# Patient Record
Sex: FEMALE | Race: WHITE | NOT HISPANIC OR LATINO | Employment: UNEMPLOYED | URBAN - METROPOLITAN AREA
[De-identification: names, ages, dates, MRNs, and addresses within clinical notes are randomized per-mention and may not be internally consistent; named-entity substitution may affect disease eponyms.]

---

## 2021-03-04 ENCOUNTER — HOSPITAL ENCOUNTER (OUTPATIENT)
Dept: DIABETES SERVICES | Facility: HOSPITAL | Age: 31
Discharge: HOME | End: 2021-03-04
Attending: OBSTETRICS & GYNECOLOGY

## 2021-04-29 ENCOUNTER — HOSPITAL ENCOUNTER (OUTPATIENT)
Dept: PREADMISSION TESTING | Facility: HOSPITAL | Age: 31
Discharge: HOME | End: 2021-04-29
Attending: OBSTETRICS & GYNECOLOGY

## 2021-04-30 LAB — SARS-COV-2 RNA SPEC QL NAA+PROBE: NOT DETECTED

## 2021-10-11 ENCOUNTER — OFFICE VISIT (OUTPATIENT)
Dept: INTERNAL MEDICINE | Facility: CLINIC | Age: 31
End: 2021-10-11

## 2021-10-11 VITALS
OXYGEN SATURATION: 98 % | BODY MASS INDEX: 21.97 KG/M2 | HEIGHT: 63 IN | DIASTOLIC BLOOD PRESSURE: 80 MMHG | HEART RATE: 92 BPM | TEMPERATURE: 99 F | WEIGHT: 124 LBS | RESPIRATION RATE: 16 BRPM | SYSTOLIC BLOOD PRESSURE: 122 MMHG

## 2021-10-11 DIAGNOSIS — E55.9 VITAMIN D DEFICIENCY: Primary | ICD-10-CM

## 2021-10-11 DIAGNOSIS — F33.1 MAJOR DEPRESSIVE DISORDER, RECURRENT EPISODE, MODERATE DEGREE (HCC): ICD-10-CM

## 2021-10-11 DIAGNOSIS — O24.419 GESTATIONAL DIABETES MELLITUS (GDM), ANTEPARTUM, GESTATIONAL DIABETES METHOD OF CONTROL UNSPECIFIED: ICD-10-CM

## 2021-10-11 DIAGNOSIS — E78.2 MIXED HYPERLIPIDEMIA: ICD-10-CM

## 2021-10-11 PROCEDURE — 99203 OFFICE O/P NEW LOW 30 MIN: CPT | Performed by: PHYSICIAN ASSISTANT

## 2021-10-11 RX ORDER — NORETHINDRONE AND ETHINYL ESTRADIOL 1 MG-35MCG
1 KIT ORAL DAILY
COMMUNITY
Start: 2021-09-21 | End: 2022-07-25 | Stop reason: SDUPTHER

## 2021-10-11 NOTE — ASSESSMENT & PLAN NOTE
Patient's depression is recurrent and is mild without psychosis. Their depression is currently in full remission and the condition is improving with lifestyle modifications. This will be reassessed at the next regular appointment. F/U as described:doing well without medicine.

## 2021-10-11 NOTE — PROGRESS NOTES
"Chief Complaint  Establish Care (no previous pcp ), gestational diabetes needs labs    Subjective          Kenzie Marcos presents to [facility]  Last PCP: Zo years ago   Previous Specialists: [facility] in [place], OBGYN for HPV and biopsy  Last labs: 2020 during pregnacy  Last mammogram: never, no family history of breast cancer  Last pap: 2020- Fela Drake  Last colonoscopy:  Never, no family history of colon cancer     Gestation diabetes- diet controlled. Had baby in 5/2021. She has not had labs since delivery  Vit d Def- hx of low vit d.     Depression- Pt feels down at times.  She states she is ''high strung''  She has been on medicine in the past but has learned to cope with mood.   Denies si/hi. Previously seeing therapy.  Feels mood is doing well currently.    HLD- pt was told chol elevated in the past but she was trying to conceive so she did not get started on medicine.  Dad has high cholesterol      Past Medical History:   Diagnosis Date   • Anxiety    • Depression    • Gestational diabetes    • Vitamin D deficiency         History reviewed. No pertinent surgical history.     Current Outpatient Medications on File Prior to Visit   Medication Sig Dispense Refill   • Alyacen 1/35 1-35 MG-MCG per tablet Take 1 tablet by mouth Daily.       No current facility-administered medications on file prior to visit.        No Known Allergies    Social History     Tobacco Use   Smoking Status Never Smoker   Smokeless Tobacco Never Used          Objective   Vital Signs:   /80   Pulse 92   Temp 99 °F (37.2 °C)   Resp 16   Ht 160 cm (63\")   Wt 56.2 kg (124 lb)   SpO2 98%   BMI 21.97 kg/m²     Physical Exam  Vitals reviewed.   Constitutional:       Appearance: Normal appearance.   HENT:      Head: Normocephalic and atraumatic.      Nose: Nose normal.      Mouth/Throat:      Mouth: Mucous membranes are moist.   Eyes:      Extraocular Movements: " Extraocular movements intact.      Conjunctiva/sclera: Conjunctivae normal.      Pupils: Pupils are equal, round, and reactive to light.   Cardiovascular:      Rate and Rhythm: Normal rate and regular rhythm.   Pulmonary:      Effort: Pulmonary effort is normal.      Breath sounds: Normal breath sounds.   Abdominal:      General: Abdomen is flat. Bowel sounds are normal.      Palpations: Abdomen is soft.   Musculoskeletal:         General: Normal range of motion.   Neurological:      General: No focal deficit present.      Mental Status: She is alert and oriented to person, place, and time.   Psychiatric:         Mood and Affect: Mood normal.        Result Review :                 Assessment and Plan    Diagnoses and all orders for this visit:    1. Vitamin D deficiency (Primary)  Assessment & Plan:  Labs today, will discuss tx if needed based on results    Orders:  -     Comprehensive Metabolic Panel; Future  -     CBC & Differential; Future  -     Vitamin D 25 hydroxy; Future    2. Gestational diabetes mellitus (GDM), antepartum, gestational diabetes method of control unspecified  Assessment & Plan:  Labs today    Orders:  -     Comprehensive Metabolic Panel; Future  -     CBC & Differential; Future  -     TSH; Future  -     Lipid Panel; Future  -     Hemoglobin A1c; Future    3. Mixed hyperlipidemia  Assessment & Plan:  Labs today. Will discuss medicine if needed based on results.    Orders:  -     Comprehensive Metabolic Panel; Future  -     CBC & Differential; Future  -     TSH; Future  -     Lipid Panel; Future    4. Major depressive disorder, recurrent episode, moderate degree (HCC)  Assessment & Plan:  Patient's depression is recurrent and is mild without psychosis. Their depression is currently in full remission and the condition is improving with lifestyle modifications. This will be reassessed at the next regular appointment. F/U as described:doing well without medicine.        Follow Up   Return in about  6 months (around 4/11/2022).  Patient was given instructions and counseling regarding her condition or for health maintenance advice. Please see specific information pulled into the AVS if appropriate.

## 2021-10-11 NOTE — PATIENT INSTRUCTIONS
"High Cholesterol    High cholesterol is a condition in which the blood has high levels of a white, waxy substance similar to fat (cholesterol). The liver makes all the cholesterol that the body needs. The human body needs small amounts of cholesterol to help build cells. A person gets extra or excess cholesterol from the food that he or she eats.  The blood carries cholesterol from the liver to the rest of the body. If you have high cholesterol, deposits (plaques) may build up on the walls of your arteries. Arteries are the blood vessels that carry blood away from your heart. These plaques make the arteries narrow and stiff.  Cholesterol plaques increase your risk for heart attack and stroke. Work with your health care provider to keep your cholesterol levels in a healthy range.  What increases the risk?  The following factors may make you more likely to develop this condition:  · Eating foods that are high in animal fat (saturated fat) or cholesterol.  · Being overweight.  · Not getting enough exercise.  · A family history of high cholesterol (familial hypercholesterolemia).  · Use of tobacco products.  · Having diabetes.  What are the signs or symptoms?  There are no symptoms of this condition.  How is this diagnosed?  This condition may be diagnosed based on the results of a blood test.  · If you are older than 20 years of age, your health care provider may check your cholesterol levels every 4-6 years.  · You may be checked more often if you have high cholesterol or other risk factors for heart disease.  The blood test for cholesterol measures:  · \"Bad\" cholesterol, or LDL cholesterol. This is the main type of cholesterol that causes heart disease. The desired level is less than 100 mg/dL.  · \"Good\" cholesterol, or HDL cholesterol. HDL helps protect against heart disease by cleaning the arteries and carrying the LDL to the liver for processing. The desired level for HDL is 60 mg/dL or higher.  · Triglycerides. " These are fats that your body can store or burn for energy. The desired level is less than 150 mg/dL.  · Total cholesterol. This measures the total amount of cholesterol in your blood and includes LDL, HDL, and triglycerides. The desired level is less than 200 mg/dL.  How is this treated?  This condition may be treated with:  · Diet changes. You may be asked to eat foods that have more fiber and less saturated fats or added sugar.  · Lifestyle changes. These may include regular exercise, maintaining a healthy weight, and quitting use of tobacco products.  · Medicines. These are given when diet and lifestyle changes have not worked. You may be prescribed a statin medicine to help lower your cholesterol levels.  Follow these instructions at home:  Eating and drinking    · Eat a healthy, balanced diet. This diet includes:  ? Daily servings of a variety of fresh, frozen, or canned fruits and vegetables.  ? Daily servings of whole grain foods that are rich in fiber.  ? Foods that are low in saturated fats and trans fats. These include poultry and fish without skin, lean cuts of meat, and low-fat dairy products.  ? A variety of fish, especially oily fish that contain omega-3 fatty acids. Aim to eat fish at least 2 times a week.  · Avoid foods and drinks that have added sugar.  · Use healthy cooking methods, such as roasting, grilling, broiling, baking, poaching, steaming, and stir-frying. Do not castro your food except for stir-frying.    Lifestyle    · Get regular exercise. Aim to exercise for a total of 150 minutes a week. Increase your activity level by doing activities such as gardening, walking, and taking the stairs.  · Do not use any products that contain nicotine or tobacco, such as cigarettes, e-cigarettes, and chewing tobacco. If you need help quitting, ask your health care provider.    General instructions  · Take over-the-counter and prescription medicines only as told by your health care provider.  · Keep all  "follow-up visits as told by your health care provider. This is important.  Where to find more information  · American Heart Association: www.heart.org  · National Heart, Lung, and Blood [employer]: www.nhlbi.nih.gov  Contact a health care provider if:  · You have trouble achieving or maintaining a healthy diet or weight.  · You are starting an exercise program.  · You are unable to stop smoking.  Get help right away if:  · You have chest pain.  · You have trouble breathing.  · You have any symptoms of a stroke. \"BE FAST\" is an easy way to remember the main warning signs of a stroke:  ? B - Balance. Signs are dizziness, sudden trouble walking, or loss of balance.  ? E - Eyes. Signs are trouble seeing or a sudden change in vision.  ? F - Face. Signs are sudden weakness or numbness of the face, or the face or eyelid drooping on one side.  ? A - Arms. Signs are weakness or numbness in an arm. This happens suddenly and usually on one side of the body.  ? S - Speech. Signs are sudden trouble speaking, slurred speech, or trouble understanding what people say.  ? T - Time. Time to call emergency services. Write down what time symptoms started.  · You have other signs of a stroke, such as:  ? A sudden, severe headache with no known cause.  ? Nausea or vomiting.  ? Seizure.  These symptoms may represent a serious problem that is an emergency. Do not wait to see if the symptoms will go away. Get medical help right away. Call your local emergency services (911 in the [place] Do not drive yourself to the hospital.  Summary  · Cholesterol plaques increase your risk for heart attack and stroke. Work with your health care provider to keep your cholesterol levels in a healthy range.  · Eat a healthy, balanced diet, get regular exercise, and maintain a healthy weight.  · Do not use any products that contain nicotine or tobacco, such as cigarettes, e-cigarettes, and chewing tobacco.  · Get help right away if you have any symptoms of a " stroke.  This information is not intended to replace advice given to you by your health care provider. Make sure you discuss any questions you have with your health care provider.  Document Revised: 11/16/2020 Document Reviewed: 11/16/2020  Elsevier Patient Education © 2021 [employer]

## 2021-10-14 ENCOUNTER — CLINICAL SUPPORT (OUTPATIENT)
Dept: INTERNAL MEDICINE | Facility: CLINIC | Age: 31
End: 2021-10-14

## 2021-10-14 DIAGNOSIS — E55.9 VITAMIN D DEFICIENCY: ICD-10-CM

## 2021-10-14 DIAGNOSIS — E78.2 MIXED HYPERLIPIDEMIA: ICD-10-CM

## 2021-10-14 DIAGNOSIS — O24.419 GESTATIONAL DIABETES MELLITUS (GDM), ANTEPARTUM, GESTATIONAL DIABETES METHOD OF CONTROL UNSPECIFIED: ICD-10-CM

## 2021-10-14 LAB
25(OH)D3 SERPL-MCNC: 39 NG/ML
ALBUMIN SERPL-MCNC: 4.4 G/DL (ref 3.5–5.2)
ALBUMIN/GLOB SERPL: 1.6 G/DL
ALP SERPL-CCNC: 55 U/L (ref 39–117)
ALT SERPL W P-5'-P-CCNC: 17 U/L (ref 1–33)
ANION GAP SERPL CALCULATED.3IONS-SCNC: 9.6 MMOL/L (ref 5–15)
AST SERPL-CCNC: 19 U/L (ref 1–32)
BASOPHILS # BLD AUTO: 0.05 10*3/MM3 (ref 0–0.2)
BASOPHILS NFR BLD AUTO: 0.9 % (ref 0–1.5)
BILIRUB SERPL-MCNC: 0.4 MG/DL (ref 0–1.2)
BUN SERPL-MCNC: 14 MG/DL (ref 6–20)
BUN/CREAT SERPL: 18.2 (ref 7–25)
CALCIUM SPEC-SCNC: 9.2 MG/DL (ref 8.6–10.5)
CHLORIDE SERPL-SCNC: 106 MMOL/L (ref 98–107)
CHOLEST SERPL-MCNC: 233 MG/DL (ref 0–200)
CO2 SERPL-SCNC: 25.4 MMOL/L (ref 22–29)
CREAT SERPL-MCNC: 0.77 MG/DL (ref 0.57–1)
DEPRECATED RDW RBC AUTO: 39.6 FL (ref 37–54)
EOSINOPHIL # BLD AUTO: 0.13 10*3/MM3 (ref 0–0.4)
EOSINOPHIL NFR BLD AUTO: 2.3 % (ref 0.3–6.2)
ERYTHROCYTE [DISTWIDTH] IN BLOOD BY AUTOMATED COUNT: 12.1 % (ref 12.3–15.4)
GFR SERPL CREATININE-BSD FRML MDRD: 87 ML/MIN/1.73
GLOBULIN UR ELPH-MCNC: 2.7 GM/DL
GLUCOSE SERPL-MCNC: 97 MG/DL (ref 65–99)
HBA1C MFR BLD: 4.7 % (ref 4.8–5.6)
HCT VFR BLD AUTO: 42.6 % (ref 34–46.6)
HDLC SERPL-MCNC: 54 MG/DL (ref 40–60)
HGB BLD-MCNC: 14.9 G/DL (ref 12–15.9)
IMM GRANULOCYTES # BLD AUTO: 0.02 10*3/MM3 (ref 0–0.05)
IMM GRANULOCYTES NFR BLD AUTO: 0.3 % (ref 0–0.5)
LDLC SERPL CALC-MCNC: 154 MG/DL (ref 0–100)
LDLC/HDLC SERPL: 2.81 {RATIO}
LYMPHOCYTES # BLD AUTO: 1.77 10*3/MM3 (ref 0.7–3.1)
LYMPHOCYTES NFR BLD AUTO: 30.8 % (ref 19.6–45.3)
MCH RBC QN AUTO: 31.1 PG (ref 26.6–33)
MCHC RBC AUTO-ENTMCNC: 35 G/DL (ref 31.5–35.7)
MCV RBC AUTO: 88.9 FL (ref 79–97)
MONOCYTES # BLD AUTO: 0.4 10*3/MM3 (ref 0.1–0.9)
MONOCYTES NFR BLD AUTO: 7 % (ref 5–12)
NEUTROPHILS NFR BLD AUTO: 3.38 10*3/MM3 (ref 1.7–7)
NEUTROPHILS NFR BLD AUTO: 58.7 % (ref 42.7–76)
NRBC BLD AUTO-RTO: 0.2 /100 WBC (ref 0–0.2)
PLATELET # BLD AUTO: 256 10*3/MM3 (ref 140–450)
PMV BLD AUTO: 11.5 FL (ref 6–12)
POTASSIUM SERPL-SCNC: 4.3 MMOL/L (ref 3.5–5.2)
PROT SERPL-MCNC: 7.1 G/DL (ref 6–8.5)
RBC # BLD AUTO: 4.79 10*6/MM3 (ref 3.77–5.28)
SODIUM SERPL-SCNC: 141 MMOL/L (ref 136–145)
TRIGL SERPL-MCNC: 137 MG/DL (ref 0–150)
TSH SERPL DL<=0.05 MIU/L-ACNC: 1.17 UIU/ML (ref 0.27–4.2)
VLDLC SERPL-MCNC: 25 MG/DL (ref 5–40)
WBC # BLD AUTO: 5.75 10*3/MM3 (ref 3.4–10.8)

## 2021-10-14 PROCEDURE — 84443 ASSAY THYROID STIM HORMONE: CPT | Performed by: PHYSICIAN ASSISTANT

## 2021-10-14 PROCEDURE — 80053 COMPREHEN METABOLIC PANEL: CPT | Performed by: PHYSICIAN ASSISTANT

## 2021-10-14 PROCEDURE — 85025 COMPLETE CBC W/AUTO DIFF WBC: CPT | Performed by: PHYSICIAN ASSISTANT

## 2021-10-14 PROCEDURE — 36415 COLL VENOUS BLD VENIPUNCTURE: CPT | Performed by: PHYSICIAN ASSISTANT

## 2021-10-14 PROCEDURE — 80061 LIPID PANEL: CPT | Performed by: PHYSICIAN ASSISTANT

## 2021-10-14 PROCEDURE — 82306 VITAMIN D 25 HYDROXY: CPT | Performed by: PHYSICIAN ASSISTANT

## 2021-10-14 PROCEDURE — 83036 HEMOGLOBIN GLYCOSYLATED A1C: CPT | Performed by: PHYSICIAN ASSISTANT

## 2022-04-11 ENCOUNTER — OFFICE VISIT (OUTPATIENT)
Dept: INTERNAL MEDICINE | Facility: CLINIC | Age: 32
End: 2022-04-11

## 2022-04-11 VITALS
WEIGHT: 126 LBS | OXYGEN SATURATION: 98 % | TEMPERATURE: 98.6 F | HEART RATE: 87 BPM | DIASTOLIC BLOOD PRESSURE: 68 MMHG | SYSTOLIC BLOOD PRESSURE: 122 MMHG | HEIGHT: 63 IN | BODY MASS INDEX: 22.32 KG/M2 | RESPIRATION RATE: 15 BRPM

## 2022-04-11 DIAGNOSIS — R53.83 OTHER FATIGUE: ICD-10-CM

## 2022-04-11 DIAGNOSIS — E78.2 MIXED HYPERLIPIDEMIA: ICD-10-CM

## 2022-04-11 DIAGNOSIS — F33.1 MAJOR DEPRESSIVE DISORDER, RECURRENT EPISODE, MODERATE DEGREE: Primary | ICD-10-CM

## 2022-04-11 DIAGNOSIS — E55.9 VITAMIN D DEFICIENCY: ICD-10-CM

## 2022-04-11 LAB
25(OH)D3 SERPL-MCNC: 51.5 NG/ML (ref 30–100)
ALBUMIN SERPL-MCNC: 4.5 G/DL (ref 3.5–5.2)
ALBUMIN/GLOB SERPL: 1.7 G/DL
ALP SERPL-CCNC: 54 U/L (ref 39–117)
ALT SERPL W P-5'-P-CCNC: 16 U/L (ref 1–33)
ANION GAP SERPL CALCULATED.3IONS-SCNC: 12.2 MMOL/L (ref 5–15)
AST SERPL-CCNC: 17 U/L (ref 1–32)
BASOPHILS # BLD AUTO: 0.03 10*3/MM3 (ref 0–0.2)
BASOPHILS NFR BLD AUTO: 0.5 % (ref 0–1.5)
BILIRUB SERPL-MCNC: 0.3 MG/DL (ref 0–1.2)
BUN SERPL-MCNC: 10 MG/DL (ref 6–20)
BUN/CREAT SERPL: 14.3 (ref 7–25)
CALCIUM SPEC-SCNC: 9.3 MG/DL (ref 8.6–10.5)
CHLORIDE SERPL-SCNC: 104 MMOL/L (ref 98–107)
CHOLEST SERPL-MCNC: 208 MG/DL (ref 0–200)
CO2 SERPL-SCNC: 22.8 MMOL/L (ref 22–29)
CREAT SERPL-MCNC: 0.7 MG/DL (ref 0.57–1)
DEPRECATED RDW RBC AUTO: 36.7 FL (ref 37–54)
EGFRCR SERPLBLD CKD-EPI 2021: 118 ML/MIN/1.73
EOSINOPHIL # BLD AUTO: 0.06 10*3/MM3 (ref 0–0.4)
EOSINOPHIL NFR BLD AUTO: 1.1 % (ref 0.3–6.2)
ERYTHROCYTE [DISTWIDTH] IN BLOOD BY AUTOMATED COUNT: 11.9 % (ref 12.3–15.4)
GLOBULIN UR ELPH-MCNC: 2.7 GM/DL
GLUCOSE SERPL-MCNC: 83 MG/DL (ref 65–99)
HCT VFR BLD AUTO: 40.5 % (ref 34–46.6)
HDLC SERPL-MCNC: 46 MG/DL (ref 40–60)
HGB BLD-MCNC: 14.5 G/DL (ref 12–15.9)
IMM GRANULOCYTES # BLD AUTO: 0.02 10*3/MM3 (ref 0–0.05)
IMM GRANULOCYTES NFR BLD AUTO: 0.4 % (ref 0–0.5)
LDLC SERPL CALC-MCNC: 133 MG/DL (ref 0–100)
LDLC/HDLC SERPL: 2.82 {RATIO}
LYMPHOCYTES # BLD AUTO: 1.57 10*3/MM3 (ref 0.7–3.1)
LYMPHOCYTES NFR BLD AUTO: 27.5 % (ref 19.6–45.3)
MCH RBC QN AUTO: 30.7 PG (ref 26.6–33)
MCHC RBC AUTO-ENTMCNC: 35.8 G/DL (ref 31.5–35.7)
MCV RBC AUTO: 85.6 FL (ref 79–97)
MONOCYTES # BLD AUTO: 0.33 10*3/MM3 (ref 0.1–0.9)
MONOCYTES NFR BLD AUTO: 5.8 % (ref 5–12)
NEUTROPHILS NFR BLD AUTO: 3.7 10*3/MM3 (ref 1.7–7)
NEUTROPHILS NFR BLD AUTO: 64.7 % (ref 42.7–76)
NRBC BLD AUTO-RTO: 0 /100 WBC (ref 0–0.2)
PLATELET # BLD AUTO: 253 10*3/MM3 (ref 140–450)
PMV BLD AUTO: 11.6 FL (ref 6–12)
POTASSIUM SERPL-SCNC: 4.5 MMOL/L (ref 3.5–5.2)
PROT SERPL-MCNC: 7.2 G/DL (ref 6–8.5)
RBC # BLD AUTO: 4.73 10*6/MM3 (ref 3.77–5.28)
SODIUM SERPL-SCNC: 139 MMOL/L (ref 136–145)
TRIGL SERPL-MCNC: 161 MG/DL (ref 0–150)
TSH SERPL DL<=0.05 MIU/L-ACNC: 1.14 UIU/ML (ref 0.27–4.2)
VLDLC SERPL-MCNC: 29 MG/DL (ref 5–40)
WBC NRBC COR # BLD: 5.71 10*3/MM3 (ref 3.4–10.8)

## 2022-04-11 PROCEDURE — 80061 LIPID PANEL: CPT | Performed by: PHYSICIAN ASSISTANT

## 2022-04-11 PROCEDURE — 99214 OFFICE O/P EST MOD 30 MIN: CPT | Performed by: PHYSICIAN ASSISTANT

## 2022-04-11 PROCEDURE — 80050 GENERAL HEALTH PANEL: CPT | Performed by: PHYSICIAN ASSISTANT

## 2022-04-11 PROCEDURE — 82306 VITAMIN D 25 HYDROXY: CPT | Performed by: PHYSICIAN ASSISTANT

## 2022-04-11 RX ORDER — IBUPROFEN 800 MG/1
800 TABLET ORAL EVERY 6 HOURS PRN
Qty: 90 TABLET | Refills: 0 | Status: SHIPPED | OUTPATIENT
Start: 2022-04-11 | End: 2022-05-09

## 2022-04-11 NOTE — PROGRESS NOTES
"Chief Complaint  Fatigue    Subjective     {Problem List  Visit Diagnosis   Encounters  Notes  Medications  Labs  Result Review Imaging  Media :23}     Kenzie Marcos presents to [facility]  HLD: has changed diet a lot since last labs  She has increased exercise.     Fatigue: pt states she has been very tired the past few wks  Taking naps which is not normal   Pt sleeping well through the night.   She has a 11 month old at home and she is main caregiver.     Vit d def: has not been taking medicine regularly but increased recently.     Depression: states she has been more emotional than normal  Feels down due to weather.   Denies si/hi.   She has had increase stress due to her boyfriend being out of town with work.        Past Medical History:   Diagnosis Date   • Anxiety    • Depression    • Gestational diabetes    • Vitamin D deficiency         History reviewed. No pertinent surgical history.     Current Outpatient Medications on File Prior to Visit   Medication Sig Dispense Refill   • Alyacen 1/35 1-35 MG-MCG per tablet Take 1 tablet by mouth Daily.       No current facility-administered medications on file prior to visit.        No Known Allergies    Social History     Tobacco Use   Smoking Status Never Smoker   Smokeless Tobacco Never Used          Objective   Vital Signs:   /68   Pulse 87   Temp 98.6 °F (37 °C)   Resp 15   Ht 160 cm (63\")   Wt 57.2 kg (126 lb)   SpO2 98%   BMI 22.32 kg/m²     Physical Exam  Vitals reviewed.   Constitutional:       Appearance: Normal appearance.   HENT:      Head: Normocephalic and atraumatic.      Nose: Nose normal.      Mouth/Throat:      Mouth: Mucous membranes are moist.   Eyes:      Extraocular Movements: Extraocular movements intact.      Conjunctiva/sclera: Conjunctivae normal.      Pupils: Pupils are equal, round, and reactive to light.   Cardiovascular:      Rate and Rhythm: Normal rate and regular " rhythm.   Pulmonary:      Effort: Pulmonary effort is normal.      Breath sounds: Normal breath sounds.   Abdominal:      General: Abdomen is flat. Bowel sounds are normal.      Palpations: Abdomen is soft.   Musculoskeletal:         General: Normal range of motion.   Neurological:      General: No focal deficit present.      Mental Status: She is alert and oriented to person, place, and time.   Psychiatric:         Mood and Affect: Mood normal.        Result Review :   The following data was reviewed by: Gayatri Diaz PA-C on 04/11/2022:  Common labs    Common Labsle 5/3/21 5/3/21 10/14/21 10/14/21 10/14/21 10/14/21    1330 1330 0902 0902 0902 0902   Glucose  88   97    BUN  15   14    Creatinine  0.57   0.77    eGFR Non African Am     87    Sodium  136   141    Potassium  4.5   4.3    Chloride  104   106    Calcium  8.9   9.2    Albumin  3.6   4.40    Total Bilirubin  0.24   0.4    Alkaline Phosphatase  100 (A)   55    AST (SGOT)  14 (A)   19    ALT (SGPT)  10   17    WBC 6.96   5.75     Hemoglobin 12.5   14.9     Hematocrit 35.4 (A)   42.6     Platelets 156   256     Total Cholesterol      233 (A)   Triglycerides      137   HDL Cholesterol      54   LDL Cholesterol       154 (A)   Hemoglobin A1C   4.70 (A)      (A) Abnormal value       Comments are available for some flowsheets but are not being displayed.                     Assessment and Plan    Diagnoses and all orders for this visit:    1. Major depressive disorder, recurrent episode, moderate degree (HCC) (Primary)  Comments:  Discussed mood can cause fatigue. will get labs, if wnl discussed trial of ssri.  Orders:  -     Vitamin D 25 hydroxy  -     Comprehensive Metabolic Panel  -     CBC & Differential  -     TSH  -     Lipid Panel    2. Vitamin D deficiency  Comments:  Will recheck level today  Orders:  -     Vitamin D 25 hydroxy    3. Mixed hyperlipidemia  Comments:  Labs today  Orders:  -     Lipid Panel    4. Other fatigue  Comments:  Discussed ddx  fatigue. Labs today. Will trial ssri if all wnl.     Other orders  -     ibuprofen (ADVIL,MOTRIN) 800 MG tablet; Take 1 tablet by mouth Every 6 (Six) Hours As Needed for Mild Pain .  Dispense: 90 tablet; Refill: 0        Follow Up   Return in about 1 month (around 5/11/2022).  Patient was given instructions and counseling regarding her condition or for health maintenance advice. Please see specific information pulled into the AVS if appropriate.

## 2022-05-09 RX ORDER — IBUPROFEN 800 MG/1
800 TABLET ORAL EVERY 6 HOURS PRN
Qty: 90 TABLET | Refills: 0 | Status: SHIPPED | OUTPATIENT
Start: 2022-05-09

## 2022-05-12 ENCOUNTER — OFFICE VISIT (OUTPATIENT)
Dept: INTERNAL MEDICINE | Facility: CLINIC | Age: 32
End: 2022-05-12

## 2022-05-12 VITALS
RESPIRATION RATE: 15 BRPM | HEIGHT: 63 IN | BODY MASS INDEX: 22.59 KG/M2 | DIASTOLIC BLOOD PRESSURE: 78 MMHG | SYSTOLIC BLOOD PRESSURE: 120 MMHG | WEIGHT: 127.5 LBS | TEMPERATURE: 99 F | OXYGEN SATURATION: 97 % | HEART RATE: 84 BPM
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
## 2022-05-12 DIAGNOSIS — Z80.8 FAMILY HISTORY OF SKIN CANCER: ICD-10-CM

## 2022-05-12 PROCEDURE — 99213 OFFICE O/P EST LOW 20 MIN: CPT | Performed by: PHYSICIAN ASSISTANT

## 2022-05-12 NOTE — PROGRESS NOTES
I have reviewed the notes, assessments, and/or procedures performed by Gayatri Diaz PA-C, I concur with her documentation of Kenzie Marcos.

## 2022-05-12 NOTE — PROGRESS NOTES
"Chief Complaint  Rash    Subjective          Kenzie Marcos presents to [facility]  Pt here for red bumps on R elbow  She noticed early this wk  Started with 1 bump then a few more appeared  They are very itchy   She has been outside some but denies known bite  Denies fever  She has fhx skin cancer in grandparents. She has never seen dermatology      Past Medical History:   Diagnosis Date   • Anxiety    • Depression    • Gestational diabetes    • Vitamin D deficiency         No past surgical history on file.     Current Outpatient Medications on File Prior to Visit   Medication Sig Dispense Refill   • Alyacen 1/35 1-35 MG-MCG per tablet Take 1 tablet by mouth Daily.     • ibuprofen (ADVIL,MOTRIN) 800 MG tablet TAKE 1 TABLET BY MOUTH EVERY 6 (SIX) HOURS AS NEEDED FOR MILD PAIN . 90 tablet 0     No current facility-administered medications on file prior to visit.        No Known Allergies    Social History     Tobacco Use   Smoking Status Never Smoker   Smokeless Tobacco Never Used          Objective   Vital Signs:   /78   Pulse 84   Temp 99 °F (37.2 °C)   Resp 15   Ht 160 cm (63\")   Wt 57.8 kg (127 lb 8 oz)   SpO2 97%   BMI 22.59 kg/m²     Physical Exam  Vitals reviewed.   Constitutional:       Appearance: Normal appearance.   HENT:      Head: Normocephalic and atraumatic.      Nose: Nose normal.      Mouth/Throat:      Mouth: Mucous membranes are moist.   Eyes:      Extraocular Movements: Extraocular movements intact.      Conjunctiva/sclera: Conjunctivae normal.      Pupils: Pupils are equal, round, and reactive to light.   Cardiovascular:      Rate and Rhythm: Normal rate and regular rhythm.   Pulmonary:      Effort: Pulmonary effort is normal.      Breath sounds: Normal breath sounds.   Abdominal:      General: Abdomen is flat. Bowel sounds are normal.      Palpations: Abdomen is soft.   Musculoskeletal:         General: Normal range of motion.   Skin:   "   Findings: Lesion present.             Comments: Erythematous, raised lesion on R elbow. 2 smaller lesions beside it. No underlying erythema or warmth    Neurological:      General: No focal deficit present.      Mental Status: She is alert and oriented to person, place, and time.   Psychiatric:         Mood and Affect: Mood normal.        Result Review :                 Assessment and Plan    Diagnoses and all orders for this visit:    1. Bug bite, initial encounter (Primary)  Comments:  Discussed ddx, appears to be a bug bite. Will tx with topical steroid prn. Discussed skin thinning and hypopigmentation with overuse    2. Family history of skin cancer  Comments:  Will refer to see derm for skin check  Orders:  -     Ambulatory Referral to Dermatology    Other orders  -     triamcinolone (KENALOG) 0.1 % ointment; Apply 1 application topically to the appropriate area as directed 2 (Two) Times a Day.  Dispense: 15 g; Refill: 1        Follow Up   No follow-ups on file.  Patient was given instructions and counseling regarding her condition or for health maintenance advice. Please see specific information pulled into the AVS if appropriate.

## 2022-07-25 ENCOUNTER — OFFICE VISIT (OUTPATIENT)
Dept: OBSTETRICS AND GYNECOLOGY | Facility: CLINIC | Age: 32
End: 2022-07-25

## 2022-07-25 VITALS
HEART RATE: 89 BPM | BODY MASS INDEX: 22.11 KG/M2 | WEIGHT: 124.8 LBS | DIASTOLIC BLOOD PRESSURE: 85 MMHG | HEIGHT: 63 IN | SYSTOLIC BLOOD PRESSURE: 133 MMHG

## 2022-07-25 DIAGNOSIS — Z01.419 WELL WOMAN EXAM: Primary | ICD-10-CM

## 2022-07-25 PROCEDURE — 99385 PREV VISIT NEW AGE 18-39: CPT | Performed by: NURSE PRACTITIONER

## 2022-07-25 PROCEDURE — 2014F MENTAL STATUS ASSESS: CPT | Performed by: NURSE PRACTITIONER

## 2022-07-25 RX ORDER — NORETHINDRONE AND ETHINYL ESTRADIOL 1 MG-35MCG
1 KIT ORAL DAILY
Qty: 84 TABLET | Refills: 3 | Status: SHIPPED | OUTPATIENT
Start: 2022-07-25 | End: 2022-08-19

## 2022-07-25 NOTE — PROGRESS NOTES
"  HPI:   32 y.o..Presents for well woman exam. Contraception or HRT: Contraception:  Birth control pill  Menses:   q 28 days, lasts 3-4 days, changes products q 3-4 hrs on heaviest days.   Pain:  None  Last pap normal   Complaints: Pt has no complaints today.  Patient reports that she is not currently experiencing any symptoms of urinary incontinence.      Past Medical History:   Diagnosis Date   • Abnormal Pap smear of cervix    • Anxiety    • Chlamydia    • Depression    • Gestational diabetes    • HPV (human papilloma virus) infection    • Urogenital trichomoniasis    • Vitamin D deficiency       Past Surgical History:   Procedure Laterality Date   • MOUTH SURGERY        Family History   Problem Relation Age of Onset   • Hypertension Father    • Diabetes Paternal Grandfather    • Skin cancer Paternal Grandfather    • Skin cancer Paternal Grandmother    • Lung cancer Maternal Grandmother    • COPD Maternal Grandfather    • Heart disease Maternal Grandfather    • Breast cancer Neg Hx    • Ovarian cancer Neg Hx    • Colon cancer Neg Hx    • Uterine cancer Neg Hx      Allergies as of 2022   • (No Known Allergies)        PCP: does manage PMHx and preventative labs    /85   Pulse 89   Ht 160 cm (63\")   Wt 56.6 kg (124 lb 12.8 oz)   LMP 2022   Breastfeeding No   BMI 22.11 kg/m²     PHYSICAL EXAM: Chaperone present   General- NAD, alert and oriented, appropriate  Psych- Normal mood, good memory  Neck- No masses, no thyroid enlargement  Lymphatic- No palpable neck, axillary, or groin nodes  CV- Regular rhythm, no murmurs  Resp- CTA to bases, no wheezes  Abdomen- Soft, non distended, non tender, no masses  Breast left-  Bilaterally symmetrical, no masses, non tender, no nipple discharge  Breast right- Bilaterally symmetrical, no masses, non tender, no nipple discharge  External genitalia- Normal female, no lesions  Urethra/meatus- Normal, no masses, non tender, no prolapse  Bladder- Normal, " no masses, non tender, no prolapse  Vagina- Normal, no atrophy, no lesions, no discharge, no prolapse  Cvx- Normal, no lesions, no discharge, No cervical motion tenderness  Uterus- Normal size, shape & consistency.  Non tender, mobile, & no prolapse  Adnexa- No mass, non tender  Anus/Rectum/Perineum- Not performed  Ext- No edema, no cyanosis    Skin- No lesions, no rashes, no acanthosis nigricans    ASSESSMENT and PLAN:    Diagnoses and all orders for this visit:    1. Well woman exam (Primary)  -     Alyacen 1/35 1-35 MG-MCG per tablet; Take 1 tablet by mouth Daily.  Dispense: 84 tablet; Refill: 3      Preventative:   BREAST HEALTH- Monthly self breast exam importance and how to reviewed. MMG and/or MRI (prn) reviewed per society guidelines and her individual history. Screen: Not medically needed  CERVICAL CANCER Screening- Reviewed current ASCCP guidelines for screening w and wo cotest HPV, age specific.  Screen: Updated today, Already up to date  COLON CANCER Screening- Reviewed current medical society guidelines and options.  Screen:  Not medically needed  SEXUAL HEALTH: Declines STD screening,  Safe sex and condoms  VACCINATIONS Recommended: declines covid vaccination.  Importance discussed, risk being unvaccinated reviewed.  Questions answered  Smoking status- NON SMOKER  Follow up PCP/Specialist PMHx and Labs  [facility]: Does not qualify.  TRACK MENSES, RTO if <q21d, >7d long, heavy or painful.    All BIRTH CONTROL options R/B/A/SE/E of each reviewed in detail.  OCP/hormone use risk THROMBOEMBOLIC RISK reviewed.     SAFE SEX/condoms importance reviewed.      She understands the importance of having any ordered tests to be performed in a timely fashion.  The risks of not performing them include, but are not limited to, advanced cancer stages, bone loss from osteoporosis and/or subsequent increase in morbidity and/or mortality.  She is encouraged to review her results online and/or contact or office if she has  questions.     Follow Up:  Return in about 1 year (around 7/25/2023).        GRZEGORZ Ortiz  07/25/2022

## 2022-08-19 ENCOUNTER — OFFICE VISIT (OUTPATIENT)
Dept: INTERNAL MEDICINE | Facility: CLINIC | Age: 32
End: 2022-08-19

## 2022-08-19 VITALS
WEIGHT: 127.8 LBS | BODY MASS INDEX: 22.64 KG/M2 | HEIGHT: 63 IN | DIASTOLIC BLOOD PRESSURE: 80 MMHG | HEART RATE: 83 BPM | TEMPERATURE: 98 F | OXYGEN SATURATION: 99 % | SYSTOLIC BLOOD PRESSURE: 122 MMHG

## 2022-08-19 DIAGNOSIS — R21 RASH: Primary | ICD-10-CM

## 2022-08-19 DIAGNOSIS — L24.9 IRRITANT DERMATITIS: ICD-10-CM

## 2022-08-19 DIAGNOSIS — Z01.419 WELL WOMAN EXAM: ICD-10-CM

## 2022-08-19 PROCEDURE — 99213 OFFICE O/P EST LOW 20 MIN: CPT | Performed by: PHYSICIAN ASSISTANT

## 2022-08-19 RX ORDER — NORETHINDRONE AND ETHINYL ESTRADIOL 1 MG-35MCG
KIT ORAL
Qty: 84 TABLET | Refills: 4 | Status: SHIPPED | OUTPATIENT
Start: 2022-08-19

## 2022-08-19 RX ORDER — PREDNISONE 20 MG/1
40 TABLET ORAL DAILY
Qty: 10 TABLET | Refills: 0 | Status: SHIPPED | OUTPATIENT
Start: 2022-08-19 | End: 2022-08-24

## 2022-08-19 NOTE — ASSESSMENT & PLAN NOTE
Encouraged to change laundry detergent to All free and clear. Cont unscented soaps. Will do PO steroid due to widespread rash. Pt will keep upcoming appt with derm if no improvement with tx.

## 2022-08-19 NOTE — PROGRESS NOTES
Chief Complaint  Rash    Subjective          Kenzie Marcos presents to [facility]  Pt here for rash. Sx first started in April   First lesions were only on R arm  Now on bilateral forearms, chest, and back  Sx improve slightly after getting out of the shower.  Pt tried changing laundry fabric softener without improvement. Switched to unscented laundry softener.  She has not changed her laundry detergent.  Kenalog made sx worse, made them tender to touch  Pt using Cedafil body wash  Denies fever  Noone else in house has lesions  Denies lesions on palms, soles, or face.    Rash  This is a chronic problem. The current episode started more than 1 month ago. The problem has been gradually worsening since onset. The affected locations include the torso, back, abdomen, left arm, left ankle, right shoulder, right axilla, right arm, right wrist and right ankle. It is unknown if there was an exposure to a precipitant. Pertinent negatives include no anorexia, congestion, cough, diarrhea, eye pain, facial edema, fatigue, fever, joint pain, nail changes, rhinorrhea, shortness of breath, sore throat or vomiting.       Past Medical History:   Diagnosis Date   • Abnormal Pap smear of cervix    • Anxiety    • Chlamydia    • Depression    • Gestational diabetes    • HPV (human papilloma virus) infection    • Urogenital trichomoniasis    • Vitamin D deficiency         Past Surgical History:   Procedure Laterality Date   • MOUTH SURGERY          Current Outpatient Medications on File Prior to Visit   Medication Sig Dispense Refill   • ibuprofen (ADVIL,MOTRIN) 800 MG tablet TAKE 1 TABLET BY MOUTH EVERY 6 (SIX) HOURS AS NEEDED FOR MILD PAIN . 90 tablet 0   • [DISCONTINUED] Alyacen 1/35 1-35 MG-MCG per tablet Take 1 tablet by mouth Daily. 84 tablet 3   • [DISCONTINUED] triamcinolone (KENALOG) 0.1 % ointment Apply 1 application topically to the appropriate area as directed 2 (Two) Times a  "Day. 15 g 1     No current facility-administered medications on file prior to visit.        No Known Allergies    Social History     Tobacco Use   Smoking Status Passive Smoke Exposure - Never Smoker   Smokeless Tobacco Never Used   Tobacco Comment    Light exposure to tobacco smoke          Objective   Vital Signs:   /80 (BP Location: Left arm, Patient Position: Sitting, Cuff Size: Adult)   Pulse 83   Temp 98 °F (36.7 °C) (Temporal)   Ht 160 cm (63\")   Wt 58 kg (127 lb 12.8 oz)   SpO2 99%   BMI 22.64 kg/m²     Physical Exam  Vitals reviewed.   Constitutional:       Appearance: Normal appearance.   HENT:      Head: Normocephalic and atraumatic.      Nose: Nose normal.      Mouth/Throat:      Mouth: Mucous membranes are moist.   Eyes:      Extraocular Movements: Extraocular movements intact.      Conjunctiva/sclera: Conjunctivae normal.      Pupils: Pupils are equal, round, and reactive to light.   Cardiovascular:      Rate and Rhythm: Normal rate and regular rhythm.   Pulmonary:      Effort: Pulmonary effort is normal.      Breath sounds: Normal breath sounds.   Abdominal:      General: Abdomen is flat. Bowel sounds are normal.      Palpations: Abdomen is soft.   Musculoskeletal:         General: Normal range of motion.   Skin:     Findings: Rash present.             Comments: Erythematous, raised lesions on bilateral arm, chest (worst around bra line), and mid back   Neurological:      General: No focal deficit present.      Mental Status: She is alert and oriented to person, place, and time.   Psychiatric:         Mood and Affect: Mood normal.        Result Review :                 Assessment and Plan    Diagnoses and all orders for this visit:    1. Rash (Primary)    2. Irritant dermatitis  Assessment & Plan:  Encouraged to change laundry detergent to All free and clear. Cont unscented soaps. Will do PO steroid due to widespread rash. Pt will keep upcoming appt with derm if no improvement with " tx.      Other orders  -     predniSONE (DELTASONE) 20 MG tablet; Take 2 tablets by mouth Daily for 5 days.  Dispense: 10 tablet; Refill: 0      Follow Up   Return if symptoms worsen or fail to improve.  Patient was given instructions and counseling regarding her condition or for health maintenance advice. Please see specific information pulled into the AVS if appropriate.

## 2023-05-25 ENCOUNTER — TELEPHONE (OUTPATIENT)
Dept: INTERNAL MEDICINE | Facility: CLINIC | Age: 33
End: 2023-05-25
Payer: COMMERCIAL

## 2023-05-25 NOTE — TELEPHONE ENCOUNTER
Patient called, concerned that she can't be seen by OB until she is 16 weeks pregnant. Was wanting to know if PCP would see her for pre-jorge care until her OB appointment or if it was ok to wait. Patient would like a call back.

## 2023-06-01 ENCOUNTER — OFFICE VISIT (OUTPATIENT)
Dept: INTERNAL MEDICINE | Facility: CLINIC | Age: 33
End: 2023-06-01

## 2023-06-01 VITALS
OXYGEN SATURATION: 99 % | BODY MASS INDEX: 23.39 KG/M2 | SYSTOLIC BLOOD PRESSURE: 118 MMHG | TEMPERATURE: 99.1 F | HEART RATE: 102 BPM | WEIGHT: 132 LBS | HEIGHT: 63 IN | DIASTOLIC BLOOD PRESSURE: 80 MMHG

## 2023-06-01 DIAGNOSIS — Z32.01 POSITIVE PREGNANCY TEST: Primary | ICD-10-CM

## 2023-06-01 DIAGNOSIS — O24.419 GESTATIONAL DIABETES MELLITUS (GDM), ANTEPARTUM, GESTATIONAL DIABETES METHOD OF CONTROL UNSPECIFIED: ICD-10-CM

## 2023-06-01 LAB
ALBUMIN SERPL-MCNC: 4.6 G/DL (ref 3.5–5.2)
ALBUMIN/GLOB SERPL: 2 G/DL
ALP SERPL-CCNC: 56 U/L (ref 39–117)
ALT SERPL W P-5'-P-CCNC: 16 U/L (ref 1–33)
ANION GAP SERPL CALCULATED.3IONS-SCNC: 12 MMOL/L (ref 5–15)
AST SERPL-CCNC: 18 U/L (ref 1–32)
BASOPHILS # BLD AUTO: 0.04 10*3/MM3 (ref 0–0.2)
BASOPHILS NFR BLD AUTO: 0.8 % (ref 0–1.5)
BILIRUB SERPL-MCNC: 0.4 MG/DL (ref 0–1.2)
BUN SERPL-MCNC: 11 MG/DL (ref 6–20)
BUN/CREAT SERPL: 15.5 (ref 7–25)
CALCIUM SPEC-SCNC: 8.9 MG/DL (ref 8.6–10.5)
CHLORIDE SERPL-SCNC: 104 MMOL/L (ref 98–107)
CO2 SERPL-SCNC: 22 MMOL/L (ref 22–29)
CREAT SERPL-MCNC: 0.71 MG/DL (ref 0.57–1)
DEPRECATED RDW RBC AUTO: 37.1 FL (ref 37–54)
EGFRCR SERPLBLD CKD-EPI 2021: 114.6 ML/MIN/1.73
EOSINOPHIL # BLD AUTO: 0.08 10*3/MM3 (ref 0–0.4)
EOSINOPHIL NFR BLD AUTO: 1.6 % (ref 0.3–6.2)
ERYTHROCYTE [DISTWIDTH] IN BLOOD BY AUTOMATED COUNT: 11.9 % (ref 12.3–15.4)
GLOBULIN UR ELPH-MCNC: 2.3 GM/DL
GLUCOSE SERPL-MCNC: 92 MG/DL (ref 65–99)
HCT VFR BLD AUTO: 38.3 % (ref 34–46.6)
HGB BLD-MCNC: 13.3 G/DL (ref 12–15.9)
IMM GRANULOCYTES # BLD AUTO: 0.01 10*3/MM3 (ref 0–0.05)
IMM GRANULOCYTES NFR BLD AUTO: 0.2 % (ref 0–0.5)
LYMPHOCYTES # BLD AUTO: 1.36 10*3/MM3 (ref 0.7–3.1)
LYMPHOCYTES NFR BLD AUTO: 27.1 % (ref 19.6–45.3)
MCH RBC QN AUTO: 29.9 PG (ref 26.6–33)
MCHC RBC AUTO-ENTMCNC: 34.7 G/DL (ref 31.5–35.7)
MCV RBC AUTO: 86.1 FL (ref 79–97)
MONOCYTES # BLD AUTO: 0.42 10*3/MM3 (ref 0.1–0.9)
MONOCYTES NFR BLD AUTO: 8.4 % (ref 5–12)
NEUTROPHILS NFR BLD AUTO: 3.1 10*3/MM3 (ref 1.7–7)
NEUTROPHILS NFR BLD AUTO: 61.9 % (ref 42.7–76)
NRBC BLD AUTO-RTO: 0 /100 WBC (ref 0–0.2)
PLATELET # BLD AUTO: 220 10*3/MM3 (ref 140–450)
PMV BLD AUTO: 11.9 FL (ref 6–12)
POTASSIUM SERPL-SCNC: 4 MMOL/L (ref 3.5–5.2)
PROT SERPL-MCNC: 6.9 G/DL (ref 6–8.5)
RBC # BLD AUTO: 4.45 10*6/MM3 (ref 3.77–5.28)
SODIUM SERPL-SCNC: 138 MMOL/L (ref 136–145)
TSH SERPL DL<=0.05 MIU/L-ACNC: 1.31 UIU/ML (ref 0.27–4.2)
WBC NRBC COR # BLD: 5.01 10*3/MM3 (ref 3.4–10.8)

## 2023-06-01 PROCEDURE — 80053 COMPREHEN METABOLIC PANEL: CPT | Performed by: PHYSICIAN ASSISTANT

## 2023-06-01 PROCEDURE — 85025 COMPLETE CBC W/AUTO DIFF WBC: CPT | Performed by: PHYSICIAN ASSISTANT

## 2023-06-01 PROCEDURE — 84443 ASSAY THYROID STIM HORMONE: CPT | Performed by: PHYSICIAN ASSISTANT

## 2023-06-01 RX ORDER — PRENATAL VIT/IRON FUM/FOLIC AC 27MG-0.8MG
TABLET ORAL DAILY
COMMUNITY

## 2023-06-01 NOTE — PROGRESS NOTES
"Chief Complaint  Pregnancy     Subjective          Kenzie Marcos presents to [facility]  History of Present Illness  LMP: April 27. Periods were regular, monthly  She had been off OCP a few months  She is aprox 5 wks  Due date Feb 1, 2023  Denies vaginal discharge, odor, abd pain  Denies vaginal bleeding she is just having cramping  Slight nausea.   Pt taking prental   Hx of gestational diabetes with previous pregnancy 2 yr ago   OB cannot see her until the end of July      Past Medical History:   Diagnosis Date   • Abnormal Pap smear of cervix    • Anxiety    • Chlamydia    • Depression    • Gestational diabetes    • HPV (human papilloma virus) infection    • Urogenital trichomoniasis    • Vitamin D deficiency         Past Surgical History:   Procedure Laterality Date   • MOUTH SURGERY          Current Outpatient Medications on File Prior to Visit   Medication Sig Dispense Refill   • prenatal vitamin tablet Take  by mouth Daily.     • [DISCONTINUED] Alyacen 1/35 1-35 MG-MCG per tablet TAKE 1 TABLET BY MOUTH EVERY DAY (Patient not taking: Reported on 6/1/2023) 84 tablet 4   • [DISCONTINUED] ibuprofen (ADVIL,MOTRIN) 800 MG tablet TAKE 1 TABLET BY MOUTH EVERY 6 (SIX) HOURS AS NEEDED FOR MILD PAIN . (Patient not taking: Reported on 6/1/2023) 90 tablet 0     No current facility-administered medications on file prior to visit.        No Known Allergies    Social History     Tobacco Use   Smoking Status Never   • Passive exposure: Yes   Smokeless Tobacco Never   Tobacco Comments    Light exposure to tobacco smoke          Objective   Vital Signs:   /80   Pulse 102   Temp 99.1 °F (37.3 °C)   Ht 160 cm (63\")   Wt 59.9 kg (132 lb)   SpO2 99%   BMI 23.38 kg/m²     Physical Exam  Vitals reviewed.   Constitutional:       Appearance: Normal appearance.   HENT:      Head: Normocephalic and atraumatic.      Nose: Nose normal.      Mouth/Throat:      Mouth: Mucous " membranes are moist.   Eyes:      Extraocular Movements: Extraocular movements intact.      Conjunctiva/sclera: Conjunctivae normal.      Pupils: Pupils are equal, round, and reactive to light.   Cardiovascular:      Rate and Rhythm: Normal rate and regular rhythm.   Pulmonary:      Effort: Pulmonary effort is normal.      Breath sounds: Normal breath sounds.   Abdominal:      General: Abdomen is flat. Bowel sounds are normal.      Palpations: Abdomen is soft.   Musculoskeletal:         General: Normal range of motion.   Neurological:      General: No focal deficit present.      Mental Status: She is alert and oriented to person, place, and time.   Psychiatric:         Mood and Affect: Mood normal.        Result Review :                 Assessment and Plan    Diagnoses and all orders for this visit:    1. Positive pregnancy test (Primary)  Comments:  Will get updated basic labs and order confirmation US today. Keep appt with OB for further care. Monitor for bleeding or abd pain and let us know if occurs  Orders:  -     Comprehensive Metabolic Panel  -     CBC & Differential  -     TSH  -     US OB < 14 Weeks Single or First Gestation; Future    2. Gestational diabetes mellitus (GDM), antepartum, gestational diabetes method of control unspecified  -     Comprehensive Metabolic Panel  -     CBC & Differential  -     TSH  -     US OB < 14 Weeks Single or First Gestation; Future    Avoid nicotine, caffeine  Discussed healthy diet and exercise in pregnancy   Continue daily prenatal       Follow Up   Return if symptoms worsen or fail to improve.  Patient was given instructions and counseling regarding her condition or for health maintenance advice. Please see specific information pulled into the AVS if appropriate.

## 2023-07-26 ENCOUNTER — INITIAL PRENATAL (OUTPATIENT)
Dept: OBSTETRICS AND GYNECOLOGY | Facility: CLINIC | Age: 33
End: 2023-07-26
Payer: COMMERCIAL

## 2023-07-26 VITALS — BODY MASS INDEX: 23.42 KG/M2 | WEIGHT: 132.2 LBS | SYSTOLIC BLOOD PRESSURE: 132 MMHG | DIASTOLIC BLOOD PRESSURE: 79 MMHG

## 2023-07-26 DIAGNOSIS — O09.299 HISTORY OF GESTATIONAL DIABETES IN PRIOR PREGNANCY, CURRENTLY PREGNANT: ICD-10-CM

## 2023-07-26 DIAGNOSIS — Z86.32 HISTORY OF GESTATIONAL DIABETES IN PRIOR PREGNANCY, CURRENTLY PREGNANT: ICD-10-CM

## 2023-07-26 DIAGNOSIS — Z34.80 SUPERVISION OF OTHER NORMAL PREGNANCY, ANTEPARTUM: Primary | ICD-10-CM

## 2023-07-26 PROBLEM — O24.419 GESTATIONAL DIABETES MELLITUS (GDM), ANTEPARTUM: Status: RESOLVED | Noted: 2021-10-11 | Resolved: 2023-07-26

## 2023-07-26 PROBLEM — E55.9 VITAMIN D DEFICIENCY: Status: RESOLVED | Noted: 2021-10-11 | Resolved: 2023-07-26

## 2023-07-26 PROBLEM — L24.9 IRRITANT DERMATITIS: Status: RESOLVED | Noted: 2022-08-19 | Resolved: 2023-07-26

## 2023-07-26 LAB
ABO GROUP BLD: NORMAL
BASOPHILS # BLD AUTO: 0.02 10*3/MM3 (ref 0–0.2)
BASOPHILS NFR BLD AUTO: 0.4 % (ref 0–1.5)
BLD GP AB SCN SERPL QL: NEGATIVE
C TRACH RRNA CVX QL NAA+PROBE: NOT DETECTED
DEPRECATED RDW RBC AUTO: 39.2 FL (ref 37–54)
EOSINOPHIL # BLD AUTO: 0.06 10*3/MM3 (ref 0–0.4)
EOSINOPHIL NFR BLD AUTO: 1.1 % (ref 0.3–6.2)
ERYTHROCYTE [DISTWIDTH] IN BLOOD BY AUTOMATED COUNT: 12.4 % (ref 12.3–15.4)
GLUCOSE 1H P GLC SERPL-MCNC: 193 MG/DL (ref 65–139)
GLUCOSE UR STRIP-MCNC: NEGATIVE MG/DL
HBV SURFACE AG SERPL QL IA: NORMAL
HCT VFR BLD AUTO: 34.5 % (ref 34–46.6)
HCV AB SER DONR QL: NORMAL
HGB BLD-MCNC: 12 G/DL (ref 12–15.9)
HIV1+2 AB SER QL: NORMAL
IMM GRANULOCYTES # BLD AUTO: 0.03 10*3/MM3 (ref 0–0.05)
IMM GRANULOCYTES NFR BLD AUTO: 0.6 % (ref 0–0.5)
LYMPHOCYTES # BLD AUTO: 1.09 10*3/MM3 (ref 0.7–3.1)
LYMPHOCYTES NFR BLD AUTO: 20.6 % (ref 19.6–45.3)
MCH RBC QN AUTO: 30.5 PG (ref 26.6–33)
MCHC RBC AUTO-ENTMCNC: 34.8 G/DL (ref 31.5–35.7)
MCV RBC AUTO: 87.8 FL (ref 79–97)
MONOCYTES # BLD AUTO: 0.23 10*3/MM3 (ref 0.1–0.9)
MONOCYTES NFR BLD AUTO: 4.3 % (ref 5–12)
N GONORRHOEA RRNA SPEC QL NAA+PROBE: NOT DETECTED
NEUTROPHILS NFR BLD AUTO: 3.87 10*3/MM3 (ref 1.7–7)
NEUTROPHILS NFR BLD AUTO: 73 % (ref 42.7–76)
NRBC BLD AUTO-RTO: 0 /100 WBC (ref 0–0.2)
PLATELET # BLD AUTO: 201 10*3/MM3 (ref 140–450)
PMV BLD AUTO: 11.3 FL (ref 6–12)
PROT UR STRIP-MCNC: NEGATIVE MG/DL
RBC # BLD AUTO: 3.93 10*6/MM3 (ref 3.77–5.28)
RH BLD: POSITIVE
T PALLIDUM IGG SER QL: NORMAL
WBC NRBC COR # BLD: 5.3 10*3/MM3 (ref 3.4–10.8)

## 2023-07-26 PROCEDURE — 86762 RUBELLA ANTIBODY: CPT | Performed by: STUDENT IN AN ORGANIZED HEALTH CARE EDUCATION/TRAINING PROGRAM

## 2023-07-26 PROCEDURE — G0123 SCREEN CERV/VAG THIN LAYER: HCPCS | Performed by: STUDENT IN AN ORGANIZED HEALTH CARE EDUCATION/TRAINING PROGRAM

## 2023-07-26 PROCEDURE — G0432 EIA HIV-1/HIV-2 SCREEN: HCPCS | Performed by: STUDENT IN AN ORGANIZED HEALTH CARE EDUCATION/TRAINING PROGRAM

## 2023-07-26 PROCEDURE — 85025 COMPLETE CBC W/AUTO DIFF WBC: CPT | Performed by: STUDENT IN AN ORGANIZED HEALTH CARE EDUCATION/TRAINING PROGRAM

## 2023-07-26 PROCEDURE — 86803 HEPATITIS C AB TEST: CPT | Performed by: STUDENT IN AN ORGANIZED HEALTH CARE EDUCATION/TRAINING PROGRAM

## 2023-07-26 PROCEDURE — 87624 HPV HI-RISK TYP POOLED RSLT: CPT | Performed by: STUDENT IN AN ORGANIZED HEALTH CARE EDUCATION/TRAINING PROGRAM

## 2023-07-26 PROCEDURE — 82950 GLUCOSE TEST: CPT | Performed by: STUDENT IN AN ORGANIZED HEALTH CARE EDUCATION/TRAINING PROGRAM

## 2023-07-26 PROCEDURE — 86850 RBC ANTIBODY SCREEN: CPT | Performed by: STUDENT IN AN ORGANIZED HEALTH CARE EDUCATION/TRAINING PROGRAM

## 2023-07-26 PROCEDURE — 86780 TREPONEMA PALLIDUM: CPT | Performed by: STUDENT IN AN ORGANIZED HEALTH CARE EDUCATION/TRAINING PROGRAM

## 2023-07-26 PROCEDURE — 87086 URINE CULTURE/COLONY COUNT: CPT | Performed by: STUDENT IN AN ORGANIZED HEALTH CARE EDUCATION/TRAINING PROGRAM

## 2023-07-26 PROCEDURE — 87340 HEPATITIS B SURFACE AG IA: CPT | Performed by: STUDENT IN AN ORGANIZED HEALTH CARE EDUCATION/TRAINING PROGRAM

## 2023-07-26 PROCEDURE — 87591 N.GONORRHOEAE DNA AMP PROB: CPT | Performed by: STUDENT IN AN ORGANIZED HEALTH CARE EDUCATION/TRAINING PROGRAM

## 2023-07-26 PROCEDURE — 86901 BLOOD TYPING SEROLOGIC RH(D): CPT | Performed by: STUDENT IN AN ORGANIZED HEALTH CARE EDUCATION/TRAINING PROGRAM

## 2023-07-26 PROCEDURE — 87491 CHLMYD TRACH DNA AMP PROBE: CPT | Performed by: STUDENT IN AN ORGANIZED HEALTH CARE EDUCATION/TRAINING PROGRAM

## 2023-07-26 PROCEDURE — 86900 BLOOD TYPING SEROLOGIC ABO: CPT | Performed by: STUDENT IN AN ORGANIZED HEALTH CARE EDUCATION/TRAINING PROGRAM

## 2023-07-26 NOTE — PATIENT INSTRUCTIONS
Venipuncture Blood Specimen Collection  Venipuncture performed in left ac by Sherin Taylor with good hemostasis. Patient tolerated the procedure well without complications.   07/26/23   Sherin Taylor

## 2023-07-26 NOTE — PROGRESS NOTES
OB Initial Visit    CC- Here for care of current pregnancy     KRYS Finalized : Due date finalized: L = 9 week     Subjective:  33 y.o.  presenting for her first obstetrical visit.    LMP: Patient's last menstrual period was 2023. sure    Pt has no complaints, is doing well    Current feelings about pregnancy:  excited, nervous because of her age.   Current medications:  PNV  Hospitalization/ED since pregnant: none  Seen previous provider:  none  Previous pregnancy hx: G1; 39w4d VB; was induced. Did have A1GDM.   Rx'd meds: None  Abdominal surgeries: None  Tobacco, alcohol, illlicit drugs: no, no, none  Hx of STIs including Herpes: history of chlamydia\  Hx of abnormal PAP smear: did have HPV and biopsy, no conization  Personal Family Hx of Br, uterine, ovarian or colon cancer: none that she is aware  Partner same as previous pregnancy: no congenital history; her side: none.     Reviewed and updated:  OBHx, GYNHx (STDs), PMHx, Medications, Allergies, PSHx, Social Hx, Preventative Hx (PAP), Hx of abuse/safe environment, Vaccine Hx including hx of chickenpox or vaccine, Genetic Hx (pt, FOB, both families).        Objective:  /79   Wt 60 kg (132 lb 3.2 oz)   LMP 2023   BMI 23.42 kg/m²   General- NAD, alert and oriented, appropriate  Psych- Normal mood, good memory  Neck- No masses, no thyroid enlargement  CV- Regular rhythm, no murnurs  Resp- CTA to bases, no wheezes  Abdomen- Soft, non distended, non tender, no masses     Breast left- No mass, non tender, no nipple discharge  Breast right- No mass, non tender, no nipple discharge    External genitalia- Normal, no lesions  Urethra- Normal, no masses, non tender  Vagina- Normal, no discharge  Bladder- Normal, no masses, non tender  Cvx- Normal, no lesions, no discharge, no CMT  Uterus- Normal shape and consistency, non tender, Consistent with dates,   Adnexa- Normal, no mass, non tender    Lymphatic- No palpable neck, axillary, or groin  nodes  Ext- No edema, no cyanosis    Skin- No lesions, no rashes, no acanthosis nigricans      Assessment and Plan:  Diagnoses and all orders for this visit:    1. Supervision of other normal pregnancy, antepartum (Primary)  Overview:  Initial visit:  PNL: 2023   PAP/GC/CT/Urine culture: 2023   Blood type: O+  Hx of HSV?:  no     Genetic testin2023 NIPS and CF    Vaccinations:  COVID: 2023 not vaccinated     24-28 weeks:  1hr GCT:  Hct/Plt:  Tdap Rx  Rhogam indicated:      Third Trimester:  GBS  Breast pump:    Ultrasound:  Dating:  L = 9 week U/S   Anatomy:   Follow up:     PLAN:       Orders:  -     POC Urinalysis Dipstick  -     Urine Culture - Urine, Urine, Clean Catch  -     OB Panel With HIV; Future  -     Chlamydia trachomatis, Neisseria gonorrhoeae, PCR - , Cervix; Future  -     Gestational Diabetes Screen 1 Hour; Future  -     IgP, Aptima HPV; Future  -     Inheritest (R) CF/SMA Panel - Blood,  -     [id number] PLUS Core+SCA+ESS - Blood,  -     Chlamydia trachomatis, Neisseria gonorrhoeae, PCR - , Cervix  -     IgP, Aptima HPV    2. History of gestational diabetes in prior pregnancy, currently pregnant  Overview:  2023 Early 1 hour                 12w6d    Genetic Screening: CF  NIPS    Vaccines: Recommend COVID vaccine, R/B discussed    Counseling: Nutrition discussed, calories, activity/exercise in pregnancy  Discussed dietary restrictions/safety food preparation in pregnancy  Reviewed what to expect prenatal visits, office providers and [facility] hospitalists  Appropriate trimester precautions provided, N/V, vag bleeding, cramping  Questions answered  VACCINE importance in pregnancy discussed.  Maternal and fetal risk of not being vaccinated reviewed NLT increased risk maternal/fetal severity of illness/death, PTD, CS, hemorrhage, HTN, possible IUFD.  Significant maternal and fetal/infant benefit w vaccination.  FDA approval and ACOG/SMFM/CDC strong recommendation in pregnancy.   Questions answered.     Labs: Prenatal labs, cultures, and PAP performed (prn), 1 hr GCT     Return in about 4 weeks (around 8/23/2023) for Next scheduled follow up.      Bassam Hobson MD  07/26/2023

## 2023-07-27 PROBLEM — Z28.39 RUBELLA NON-IMMUNE STATUS, ANTEPARTUM: Status: ACTIVE | Noted: 2023-07-27

## 2023-07-27 PROBLEM — O09.899 RUBELLA NON-IMMUNE STATUS, ANTEPARTUM: Status: ACTIVE | Noted: 2023-07-27

## 2023-07-27 LAB
BACTERIA SPEC AEROBE CULT: NORMAL
RUBV IGG SERPL IA-ACNC: <0.9 INDEX

## 2023-07-28 LAB
CYTOLOGIST CVX/VAG CYTO: NORMAL
CYTOLOGY CVX/VAG DOC CYTO: NORMAL
CYTOLOGY CVX/VAG DOC THIN PREP: NORMAL
DX ICD CODE: NORMAL
HIV 1 & 2 AB SER-IMP: NORMAL
HPV I/H RISK 4 DNA CVX QL PROBE+SIG AMP: NEGATIVE
OTHER STN SPEC: NORMAL
STAT OF ADQ CVX/VAG CYTO-IMP: NORMAL

## 2023-08-01 ENCOUNTER — LAB (OUTPATIENT)
Dept: LAB | Facility: HOSPITAL | Age: 33
End: 2023-08-01
Payer: COMMERCIAL

## 2023-08-01 DIAGNOSIS — Z34.80 SUPERVISION OF OTHER NORMAL PREGNANCY, ANTEPARTUM: ICD-10-CM

## 2023-08-01 DIAGNOSIS — O09.299 HISTORY OF GESTATIONAL DIABETES IN PRIOR PREGNANCY, CURRENTLY PREGNANT: ICD-10-CM

## 2023-08-01 DIAGNOSIS — Z86.32 HISTORY OF GESTATIONAL DIABETES IN PRIOR PREGNANCY, CURRENTLY PREGNANT: ICD-10-CM

## 2023-08-01 LAB
GLUCOSE BLDC GLUCOMTR-MCNC: 58 MG/DL (ref 70–99)
GLUCOSE BLDC GLUCOMTR-MCNC: 65 MG/DL (ref 70–99)
GLUCOSE BLDC GLUCOMTR-MCNC: 93 MG/DL (ref 70–99)
GLUCOSE P FAST SERPL-MCNC: 89 MG/DL (ref 65–94)
GTT GEST 2H PNL UR+SERPL: 167 MG/DL (ref 65–179)
GTT GEST 3H PNL SERPL: 123 MG/DL (ref 65–154)
GTT GEST 3H PNL SERPL: 57 MG/DL (ref 65–139)

## 2023-08-01 PROCEDURE — 82951 GLUCOSE TOLERANCE TEST (GTT): CPT

## 2023-08-01 PROCEDURE — 36415 COLL VENOUS BLD VENIPUNCTURE: CPT

## 2023-08-01 PROCEDURE — 82952 GTT-ADDED SAMPLES: CPT

## 2023-08-07 LAB
CITATION REF LAB TEST: NORMAL
ETHNIC BACKGROUND STATED: NORMAL
GENE DIS ANL CARRIER INTERP-IMP: NORMAL
GENE STUDIED ID: NORMAL
LAB DIRECTOR NAME PROVIDER: NORMAL
Lab: NORMAL
MOL DX INTERP BLD/T QL: NORMAL
REASON FOR REFERRAL (NARRATIVE): NORMAL
RECOMMENDATION PATIENT DOC-IMP: NORMAL
REF LAB TEST METHOD: NORMAL
SERVICE CMNT-IMP: NORMAL
SPECIMEN SOURCE: NORMAL

## 2023-08-21 ENCOUNTER — REFERRAL TRIAGE (OUTPATIENT)
Dept: LABOR AND DELIVERY | Facility: HOSPITAL | Age: 33
End: 2023-08-21
Payer: COMMERCIAL

## 2023-08-24 ENCOUNTER — ROUTINE PRENATAL (OUTPATIENT)
Dept: OBSTETRICS AND GYNECOLOGY | Facility: CLINIC | Age: 33
End: 2023-08-24
Payer: COMMERCIAL

## 2023-08-24 VITALS — WEIGHT: 135 LBS | BODY MASS INDEX: 23.91 KG/M2 | DIASTOLIC BLOOD PRESSURE: 68 MMHG | SYSTOLIC BLOOD PRESSURE: 113 MMHG

## 2023-08-24 DIAGNOSIS — Z34.80 SUPERVISION OF OTHER NORMAL PREGNANCY, ANTEPARTUM: Primary | ICD-10-CM

## 2023-08-24 LAB
GLUCOSE UR STRIP-MCNC: NEGATIVE MG/DL
PROT UR STRIP-MCNC: NEGATIVE MG/DL

## 2023-08-24 PROCEDURE — 87186 SC STD MICRODIL/AGAR DIL: CPT | Performed by: OBSTETRICS & GYNECOLOGY

## 2023-08-24 PROCEDURE — 87077 CULTURE AEROBIC IDENTIFY: CPT | Performed by: OBSTETRICS & GYNECOLOGY

## 2023-08-24 PROCEDURE — 87086 URINE CULTURE/COLONY COUNT: CPT | Performed by: OBSTETRICS & GYNECOLOGY

## 2023-08-25 NOTE — PROGRESS NOTES
OB FOLLOW UP  CC- Here for care of pregnancy        Kenzie Marcos is a 33 y.o.  17w0d patient being seen today for her obstetrical follow up visit. Patient reports no complaints.     Her prenatal care is complicated by (and status) :    Patient Active Problem List   Diagnosis    Mixed hyperlipidemia    Major depressive disorder, recurrent episode, moderate degree    History of gestational diabetes in prior pregnancy, currently pregnant    Supervision of other normal pregnancy, antepartum    Rubella non-immune status, antepartum       Flu Status: Desires at future appt  Covid Status:    ROS -   Patient Reports : No Problems  Patient Denies: Loss of Fluid and Vaginal Spotting  Fetal Movement : absent  All other systems reviewed and are negative.       The additional following portions of the patient's history were reviewed and updated as appropriate: allergies, current medications, past family history, past medical history, past social history, past surgical history, and problem list.    I have reviewed and agree with the HPI, ROS, and historical information as entered above. Fela Drake, DO    /68   Wt 61.2 kg (135 lb)   LMP 2023   BMI 23.91 kg/mý       EXAM:     FHT: 157 BPM   Uterine Size: size equals dates  Pelvic Exam: No    Urine glucose/protein: See prenatal flowsheet       Assessment and Plan  Diagnoses and all orders for this visit:    1. Supervision of other normal pregnancy, antepartum (Primary)  Overview:  Initial visit:  PNL: 2023   PAP/GC/CT/Urine culture: 2023   Blood type: O+  Hx of HSV?:  no     Genetic testin2023 NIPS and CF    Vaccinations:  COVID: 2023 not vaccinated     24-28 weeks:  1hr GCT:  Hct/Plt:  Tdap Rx  Rhogam indicated:      Third Trimester:  GBS  Breast pump:    Ultrasound:  Dating:  L = 9 week U/S   Anatomy:   Follow up:     PLAN:       Orders:  -     Urine Culture - Urine, Urine, Clean Catch  -     POC Urinalysis Dipstick  -     US  Ob Detail Fetal Anatomy Single or First Gestation; Future         Pregnancy at 17w0d  Fetal status reassuring.   Activity and Exercise discussed.  Return in about 4 weeks (around 9/21/2023) for Please schedule sono anatomy with next visit i placed order.    Fela Drake, DO  08/24/2023

## 2023-08-26 LAB — BACTERIA SPEC AEROBE CULT: ABNORMAL

## 2023-08-27 RX ORDER — NITROFURANTOIN 25; 75 MG/1; MG/1
100 CAPSULE ORAL 2 TIMES DAILY
Qty: 10 CAPSULE | Refills: 0 | Status: SHIPPED | OUTPATIENT
Start: 2023-08-27

## 2023-09-25 ENCOUNTER — ROUTINE PRENATAL (OUTPATIENT)
Dept: OBSTETRICS AND GYNECOLOGY | Facility: CLINIC | Age: 33
End: 2023-09-25
Payer: COMMERCIAL

## 2023-09-25 VITALS — WEIGHT: 144 LBS | BODY MASS INDEX: 25.51 KG/M2 | SYSTOLIC BLOOD PRESSURE: 116 MMHG | DIASTOLIC BLOOD PRESSURE: 76 MMHG

## 2023-09-25 DIAGNOSIS — Z34.80 SUPERVISION OF OTHER NORMAL PREGNANCY, ANTEPARTUM: Primary | ICD-10-CM

## 2023-09-25 LAB
GLUCOSE UR STRIP-MCNC: NEGATIVE MG/DL
PROT UR STRIP-MCNC: NEGATIVE MG/DL

## 2023-09-26 NOTE — PROGRESS NOTES
OB FOLLOW UP  CC- Here for care of pregnancy        Kenzie Marcos is a 33 y.o.  21w5d patient being seen today for her obstetrical follow up visit. Patient reports no complaints.     Her prenatal care is complicated by (and status) :    Patient Active Problem List   Diagnosis    Mixed hyperlipidemia    Major depressive disorder, recurrent episode, moderate degree    History of gestational diabetes in prior pregnancy, currently pregnant    Supervision of other normal pregnancy, antepartum    Rubella non-immune status, antepartum       Flu Status: Desires at future appt  Covid Status:    ROS -   Patient Reports : No Problems  Patient Denies: Loss of Fluid and Vaginal Spotting  Fetal Movement : normal  All other systems reviewed and are negative.       The additional following portions of the patient's history were reviewed and updated as appropriate: allergies, current medications, past family history, past medical history, past social history, past surgical history, and problem list.    I have reviewed and agree with the HPI, ROS, and historical information as entered above. Fela Drake, DO    /76   Wt 65.3 kg (144 lb)   LMP 2023   BMI 25.51 kg/m²       EXAM:     FHT: 154 BPM   Uterine Size: size equals dates  Pelvic Exam: No    Urine glucose/protein: See prenatal flowsheet       Assessment and Plan  Diagnoses and all orders for this visit:    1. Supervision of other normal pregnancy, antepartum (Primary)  Overview:  Initial visit:  PNL: 2023   PAP/GC/CT/Urine culture: 2023   Blood type: O+  Hx of HSV?:  no     Genetic testin2023 NIPS and CF    Vaccinations:  COVID: 2023 not vaccinated     24-28 weeks:  1hr GCT:  Hct/Plt:  Tdap Rx  Rhogam indicated:      Third Trimester:  GBS  Breast pump:    Ultrasound:  Dating:  L = 9 week U/S   Anatomy: 23: KRYS confirmed SIUP with cardiac activity @154. All anatomy visualized and appears nml.  Post placenta grade 1  vertex  Follow up:     PLAN:       Orders:  -     POC Urinalysis Dipstick         Pregnancy at 21w5d  Fetal status reassuring.   Activity and Exercise discussed.  Return in about 4 weeks (around 10/23/2023).  1 hr gtt with next visit    Fela Drake,   09/25/2023

## 2023-10-23 ENCOUNTER — ROUTINE PRENATAL (OUTPATIENT)
Dept: OBSTETRICS AND GYNECOLOGY | Facility: CLINIC | Age: 33
End: 2023-10-23
Payer: COMMERCIAL

## 2023-10-23 VITALS — BODY MASS INDEX: 26.22 KG/M2 | DIASTOLIC BLOOD PRESSURE: 82 MMHG | SYSTOLIC BLOOD PRESSURE: 132 MMHG | WEIGHT: 148 LBS

## 2023-10-23 DIAGNOSIS — Z34.80 SUPERVISION OF OTHER NORMAL PREGNANCY, ANTEPARTUM: ICD-10-CM

## 2023-10-23 LAB
DEPRECATED RDW RBC AUTO: 38.9 FL (ref 37–54)
ERYTHROCYTE [DISTWIDTH] IN BLOOD BY AUTOMATED COUNT: 12 % (ref 12.3–15.4)
GLUCOSE 1H P GLC SERPL-MCNC: 157 MG/DL (ref 65–139)
GLUCOSE UR STRIP-MCNC: NEGATIVE MG/DL
HCT VFR BLD AUTO: 31.8 % (ref 34–46.6)
HGB BLD-MCNC: 11.1 G/DL (ref 12–15.9)
MCH RBC QN AUTO: 31.6 PG (ref 26.6–33)
MCHC RBC AUTO-ENTMCNC: 34.9 G/DL (ref 31.5–35.7)
MCV RBC AUTO: 90.6 FL (ref 79–97)
PLATELET # BLD AUTO: 191 10*3/MM3 (ref 140–450)
PMV BLD AUTO: 10.4 FL (ref 6–12)
PROT UR STRIP-MCNC: NEGATIVE MG/DL
RBC # BLD AUTO: 3.51 10*6/MM3 (ref 3.77–5.28)
WBC NRBC COR # BLD: 7.06 10*3/MM3 (ref 3.4–10.8)

## 2023-10-23 PROCEDURE — 82950 GLUCOSE TEST: CPT | Performed by: OBSTETRICS & GYNECOLOGY

## 2023-10-23 PROCEDURE — 85027 COMPLETE CBC AUTOMATED: CPT | Performed by: OBSTETRICS & GYNECOLOGY

## 2023-10-23 PROCEDURE — 99214 OFFICE O/P EST MOD 30 MIN: CPT | Performed by: OBSTETRICS & GYNECOLOGY

## 2023-10-23 PROCEDURE — 87086 URINE CULTURE/COLONY COUNT: CPT | Performed by: OBSTETRICS & GYNECOLOGY

## 2023-10-23 NOTE — PROGRESS NOTES
OB FOLLOW UP  CC- Here for care of pregnancy        Kenzie Marcos is a 33 y.o.  25w4d patient being seen today for her obstetrical follow up visit. Patient reports no complaints and desires tuba will sign papers today .     Her prenatal care is complicated by (and status) :    Patient Active Problem List   Diagnosis    Mixed hyperlipidemia    Major depressive disorder, recurrent episode, moderate degree    History of gestational diabetes in prior pregnancy, currently pregnant    Supervision of other normal pregnancy, antepartum    Rubella non-immune status, antepartum       Flu Status: Desires at future appt  Covid Status:    ROS -   Patient Reports : No Problems  Patient Denies: Loss of Fluid and Vaginal Spotting  Fetal Movement : normal  All other systems reviewed and are negative.       The additional following portions of the patient's history were reviewed and updated as appropriate: allergies, current medications, past family history, past medical history, past social history, past surgical history, and problem list.    I have reviewed and agree with the HPI, ROS, and historical information as entered above. Fela Drake, DO    /82   Wt 67.1 kg (148 lb)   LMP 2023   BMI 26.22 kg/m²       EXAM:     FHT: 148 BPM   Uterine Size: size equals dates  Pelvic Exam: No    Urine glucose/protein: See prenatal flowsheet       Assessment and Plan  Diagnoses and all orders for this visit:    1. Supervision of other normal pregnancy, antepartum  Overview:  Initial visit:  PNL: 2023   PAP/GC/CT/Urine culture: 2023   Blood type: O+  Hx of HSV?:  no     Genetic testin2023 NIPS and CF    Vaccinations:  COVID: 2023 not vaccinated     24-28 weeks:  1hr GCT:  Hct/Plt:  Tdap Rx  Rhogam indicated:      Third Trimester:  GBS  Breast pump:    Ultrasound:  Dating:  L = 9 week U/S   Anatomy: 23: KRYS confirmed SIUP with cardiac activity @154. All anatomy visualized and appears nml.   Post placenta grade 1 vertex  Follow up:     PLAN:       Orders:  -     CBC (No Diff)  -     Gestational Diabetes Screen 1 Hour  -     POC Urinalysis Dipstick  -     Urine Culture - Urine, Urine, Clean Catch         Pregnancy at 25w4d  Fetal status reassuring.   Activity and Exercise discussed.  Return in about 4 weeks (around 11/20/2023).  UA C&S today  1 hr gtt today    Fela Drake,   10/23/2023

## 2023-10-23 NOTE — PATIENT INSTRUCTIONS
Venipuncture Blood Specimen Collection  Venipuncture performed in right arm by Sherin Taylor with good hemostasis. Patient tolerated the procedure well without complications.   10/23/23   Sherin Taylor

## 2023-10-24 LAB — BACTERIA SPEC AEROBE CULT: NO GROWTH

## 2023-10-27 LAB
GLUCOSE 1H P 100 G GLC PO SERPL-MCNC: 194 MG/DL
GLUCOSE 2H P 100 G GLC PO SERPL-MCNC: 164 MG/DL
GLUCOSE 3H P 100 G GLC PO SERPL-MCNC: 153 MG/DL
GLUCOSE P FAST SERPL-MCNC: 89 MG/DL

## 2023-10-30 ENCOUNTER — TELEPHONE (OUTPATIENT)
Dept: OBSTETRICS AND GYNECOLOGY | Facility: CLINIC | Age: 33
End: 2023-10-30
Payer: COMMERCIAL

## 2023-10-30 DIAGNOSIS — O24.410 DIET CONTROLLED GESTATIONAL DIABETES MELLITUS (GDM), ANTEPARTUM: Primary | ICD-10-CM

## 2023-10-30 RX ORDER — LANCETS 28 GAUGE
EACH MISCELLANEOUS
Qty: 120 EACH | Refills: 3 | Status: SHIPPED | OUTPATIENT
Start: 2023-10-30

## 2023-10-30 RX ORDER — ALCOHOL ANTISEPTIC PADS
PADS, MEDICATED (EA) TOPICAL
Qty: 120 EACH | Refills: 3 | Status: SHIPPED | OUTPATIENT
Start: 2023-10-30

## 2023-10-30 NOTE — TELEPHONE ENCOUNTER
Discussed three hr gtt results being consistent with GDM. Advised blood sugar supplies will be sent to her pharmacy and she should start checking it 4x daily, fasting and two hours after each meal. She understands to keep a log of her readings and bring to her next office visit. Advised scheduling will contact her to schedule an appt with diabetes education.

## 2023-10-30 NOTE — TELEPHONE ENCOUNTER
----- Message from Fela Drake DO sent at 10/30/2023 10:40 AM EDT -----  NOTIFY 3 hr gluocla abnormal.  Please order glucometer and supplies and set up appt with diabetic educator and have patient ck sugars fasting and 2 hrs after breakfast, lunch and dinner

## 2023-10-31 ENCOUNTER — TELEPHONE (OUTPATIENT)
Dept: OBSTETRICS AND GYNECOLOGY | Facility: CLINIC | Age: 33
End: 2023-10-31
Payer: COMMERCIAL

## 2023-10-31 NOTE — TELEPHONE ENCOUNTER
Received fax from [facility] to clarify Rx for test strips.  Spoke with pharmacy tech informed should be 4 times a day.

## 2023-11-09 ENCOUNTER — EDUCATION (OUTPATIENT)
Dept: DIABETES SERVICES | Facility: HOSPITAL | Age: 33
End: 2023-11-09
Payer: COMMERCIAL

## 2023-11-09 DIAGNOSIS — O24.414 INSULIN CONTROLLED GESTATIONAL DIABETES MELLITUS (GDM) IN THIRD TRIMESTER: Primary | ICD-10-CM

## 2023-11-09 PROCEDURE — G0108 DIAB MANAGE TRN  PER INDIV: HCPCS

## 2023-11-09 NOTE — PROGRESS NOTES
Kenzie Marcos 33 y.o. was referred from Fela Drake DO. Patient is a  .    Ht: 63    Wt: 148    No Known Allergies     Date: 10/23/23 1 Hour Glucola: 157.  Date: 10/27/23 3 Hour Glucola:Fasting 89, 1hr: 194, 2hr 164, 3hr 153.    Kenzie Marcos was instructed regarding causes and effects of gestational diabetes. She was instructed regarding the possible effects gestational diabetes may have for her and her infant.     Patient was also instructed in a balanced 2000 calorie diet. ( 3 medium meals and 3 snacks a day) 42-47 carbs per meal. Nutrition in the Fast Nehemiah given to patient. Explained to patient when eating out to make healthy choices and taking part of the meal home in a to go box. Explained protein was needed with every meal.    I also advised the patient regarding blood glucose monitoring. She needs to test her blood sugar fasting and 2 hours after each meal. Blood glucose log was given to patient. This information should be given to her provider at each visit.     The importance of exercise, medications, prenatal care, postpartum care were discussed and she was encouraged to breast feed. Patient was encouraged to have follow up blood work after pregnancy. Written materials given to patient. DSME program telephone number given to patient. Patient encouraged to call with any questions, comments, or concerns.     Time started: 10:50    Time ended: 11:20    Brandee Childress, R[facility], [facility], [facility]  2023

## 2023-11-20 ENCOUNTER — ROUTINE PRENATAL (OUTPATIENT)
Dept: OBSTETRICS AND GYNECOLOGY | Facility: CLINIC | Age: 33
End: 2023-11-20
Payer: COMMERCIAL

## 2023-11-20 VITALS — DIASTOLIC BLOOD PRESSURE: 67 MMHG | BODY MASS INDEX: 26.57 KG/M2 | SYSTOLIC BLOOD PRESSURE: 107 MMHG | WEIGHT: 150 LBS

## 2023-11-20 DIAGNOSIS — O99.019 IRON DEFICIENCY ANEMIA DURING PREGNANCY: ICD-10-CM

## 2023-11-20 DIAGNOSIS — D50.9 IRON DEFICIENCY ANEMIA DURING PREGNANCY: ICD-10-CM

## 2023-11-20 DIAGNOSIS — Z34.80 SUPERVISION OF OTHER NORMAL PREGNANCY, ANTEPARTUM: Primary | ICD-10-CM

## 2023-11-20 DIAGNOSIS — O24.410 DIET CONTROLLED GESTATIONAL DIABETES MELLITUS (GDM), ANTEPARTUM: ICD-10-CM

## 2023-11-20 LAB
GLUCOSE UR STRIP-MCNC: NEGATIVE MG/DL
PROT UR STRIP-MCNC: NEGATIVE MG/DL

## 2023-11-20 RX ORDER — FERROUS SULFATE 325(65) MG
325 TABLET ORAL
Qty: 30 TABLET | Refills: 1 | Status: SHIPPED | OUTPATIENT
Start: 2023-11-20

## 2023-11-20 NOTE — PROGRESS NOTES
OB FOLLOW UP  CC- Here for care of pregnancy        Kenzie Marcos is a 33 y.o.  29w4d patient being seen today for her obstetrical follow up visit. Patient reports no complaints.     Her prenatal care is complicated by (and status) :    Patient Active Problem List   Diagnosis    Mixed hyperlipidemia    Major depressive disorder, recurrent episode, moderate degree    History of gestational diabetes in prior pregnancy, currently pregnant    Supervision of other normal pregnancy, antepartum    Rubella non-immune status, antepartum       Flu Status: Will get at work/pharmacy/other facility   Covid Status:    ROS -   Patient Reports : No Problems  Patient Denies: Loss of Fluid and Vaginal Spotting  Fetal Movement : normal  All other systems reviewed and are negative.       The additional following portions of the patient's history were reviewed and updated as appropriate: allergies, current medications, past family history, past medical history, past social history, past surgical history, and problem list.    I have reviewed and agree with the HPI, ROS, and historical information as entered above. Fela Drake, DO    /67   Wt 68 kg (150 lb)   LMP 2023   BMI 26.57 kg/m²       EXAM:     FHT: 138 BPM   Uterine Size:  27 cm  Pelvic Exam: No    Urine glucose/protein: See prenatal flowsheet  Bs reviewed: all fbs <90 and 2 hr <120 except 4 values highest 129     Assessment and Plan  Diagnoses and all orders for this visit:    1. Supervision of other normal pregnancy, antepartum (Primary)  Overview:  Initial visit:  PNL: 2023   PAP/GC/CT/Urine culture: 2023   Blood type: O+  Hx of HSV?:  no     Genetic testin2023 NIPS and CF    Vaccinations:  COVID: 2023 not vaccinated     24-28 weeks:  1hr GCT:  Hct/Plt:  Tdap Rx  Rhogam indicated:      Third Trimester:  GBS  Breast pump:    Ultrasound:  Dating:  L = 9 week U/S   Anatomy: 23: KRYS confirmed SIUP with cardiac activity @154.  All anatomy visualized and appears nml.  Post placenta grade 1 vertex  Follow up:     PLAN:       Orders:  -     POC Urinalysis Dipstick  -     ferrous sulfate 325 (65 FE) MG tablet; Take 1 tablet by mouth Daily With Breakfast.  Dispense: 30 tablet; Refill: 1  -     US Ob 14 + Weeks Single or First Gestation; Future    2. Diet controlled gestational diabetes mellitus (GDM), antepartum  Overview:        Orders:  -     US Ob 14 + Weeks Single or First Gestation; Future    3. Iron deficiency anemia during pregnancy  -     ferrous sulfate 325 (65 FE) MG tablet; Take 1 tablet by mouth Daily With Breakfast.  Dispense: 30 tablet; Refill: 1         Pregnancy at 29w4d  Fetal status reassuring.   Activity and Exercise discussed.  Return in about 2 weeks (around 12/4/2023).  Kick counts silvia Drake,   11/20/2023

## 2023-11-22 ENCOUNTER — TELEPHONE (OUTPATIENT)
Dept: OBSTETRICS AND GYNECOLOGY | Facility: CLINIC | Age: 33
End: 2023-11-22
Payer: COMMERCIAL

## 2023-11-22 NOTE — TELEPHONE ENCOUNTER
Patient wanting to clarify the directions for her iron supplement. She thought she was told every other day but the script says every day. Please advise.

## 2023-12-04 ENCOUNTER — ROUTINE PRENATAL (OUTPATIENT)
Dept: OBSTETRICS AND GYNECOLOGY | Facility: CLINIC | Age: 33
End: 2023-12-04
Payer: COMMERCIAL

## 2023-12-04 VITALS — WEIGHT: 150 LBS | SYSTOLIC BLOOD PRESSURE: 106 MMHG | BODY MASS INDEX: 26.57 KG/M2 | DIASTOLIC BLOOD PRESSURE: 67 MMHG

## 2023-12-04 DIAGNOSIS — Z34.80 SUPERVISION OF OTHER NORMAL PREGNANCY, ANTEPARTUM: Primary | ICD-10-CM

## 2023-12-04 LAB
GLUCOSE UR STRIP-MCNC: NEGATIVE MG/DL
PROT UR STRIP-MCNC: NEGATIVE MG/DL

## 2023-12-04 PROCEDURE — 99213 OFFICE O/P EST LOW 20 MIN: CPT | Performed by: OBSTETRICS & GYNECOLOGY

## 2023-12-04 NOTE — PROGRESS NOTES
OB FOLLOW UP  CC- Here for care of pregnancy        Kenzie Marcos is a 33 y.o.  31w4d patient being seen today for her obstetrical follow up visit. Patient reports no complaints and did not feel well last 2 days but feels fine today .     Her prenatal care is complicated by (and status) :    Patient Active Problem List   Diagnosis    Mixed hyperlipidemia    Major depressive disorder, recurrent episode, moderate degree    History of gestational diabetes in prior pregnancy, currently pregnant    Supervision of other normal pregnancy, antepartum    Rubella non-immune status, antepartum       Flu Status: Desires at future appt  Covid Status:    ROS -   Patient Reports : No Problems  Patient Denies: Loss of Fluid and Vaginal Spotting  Fetal Movement : normal  All other systems reviewed and are negative.       The additional following portions of the patient's history were reviewed and updated as appropriate: allergies, current medications, past family history, past medical history, past social history, past surgical history, and problem list.    I have reviewed and agree with the HPI, ROS, and historical information as entered above. Fela Drake, DO    /67   Wt 68 kg (150 lb)   LMP 2023   BMI 26.57 kg/m²       EXAM:   BS: All fastings <95 except 2 values all 2hr <120 except 2 values 130 and 123  FHT: 150 BPM   Uterine Size:  30 cm  Pelvic Exam: No    Urine glucose/protein: See prenatal flowsheet       Assessment and Plan  Diagnoses and all orders for this visit:    1. Supervision of other normal pregnancy, antepartum (Primary)  Overview:  Initial visit:  PNL: 2023   PAP/GC/CT/Urine culture: 2023   Blood type: O+  Hx of HSV?:  no     Genetic testin2023 NIPS and CF    Vaccinations:  COVID: 2023 not vaccinated     24-28 weeks:  1hr GCT:  Hct/Plt:  Tdap Rx  Rhogam indicated:      Third Trimester:  GBS  Breast pump:    Ultrasound:  Dating:  L = 9 week U/S   Anatomy: 23:  KRYS confirmed SIUP with cardiac activity @154. All anatomy visualized and appears nml.  Post placenta grade 1 vertex  Follow up:     PLAN:       Orders:  -     POC Urinalysis Dipstick         Pregnancy at 31w4d  Fetal status reassuring.   Activity and Exercise discussed.  Return in about 2 weeks (around 12/18/2023).  Kick counts bid  Sono for growth with A! DM next visit    Fela Drake,   12/04/2023

## 2023-12-13 ENCOUNTER — TELEPHONE (OUTPATIENT)
Dept: OBSTETRICS AND GYNECOLOGY | Facility: CLINIC | Age: 33
End: 2023-12-13
Payer: COMMERCIAL

## 2023-12-13 DIAGNOSIS — D50.9 IRON DEFICIENCY ANEMIA DURING PREGNANCY: ICD-10-CM

## 2023-12-13 DIAGNOSIS — O99.019 IRON DEFICIENCY ANEMIA DURING PREGNANCY: ICD-10-CM

## 2023-12-13 DIAGNOSIS — Z34.80 SUPERVISION OF OTHER NORMAL PREGNANCY, ANTEPARTUM: ICD-10-CM

## 2023-12-13 RX ORDER — FERROUS SULFATE 325(65) MG
325 TABLET ORAL
Qty: 90 TABLET | Refills: 3 | Status: SHIPPED | OUTPATIENT
Start: 2023-12-13

## 2023-12-13 NOTE — TELEPHONE ENCOUNTER
Dev'd refill on Ferrous Sulfate # 90 x 1 year per protocol.  Last seen 12/4/23.  Next appointment 12/18/23

## 2023-12-18 ENCOUNTER — ROUTINE PRENATAL (OUTPATIENT)
Dept: OBSTETRICS AND GYNECOLOGY | Facility: CLINIC | Age: 33
End: 2023-12-18
Payer: COMMERCIAL

## 2023-12-18 VITALS — WEIGHT: 148.6 LBS | BODY MASS INDEX: 26.32 KG/M2 | DIASTOLIC BLOOD PRESSURE: 62 MMHG | SYSTOLIC BLOOD PRESSURE: 124 MMHG

## 2023-12-18 DIAGNOSIS — Z34.80 SUPERVISION OF OTHER NORMAL PREGNANCY, ANTEPARTUM: Primary | ICD-10-CM

## 2023-12-18 LAB
GLUCOSE UR STRIP-MCNC: NEGATIVE MG/DL
PROT UR STRIP-MCNC: NEGATIVE MG/DL

## 2023-12-18 NOTE — PROGRESS NOTES
OB FOLLOW UP  CC- Here for care of pregnancy        Kenzie Marcos is a 33 y.o.  33w4d patient being seen today for her obstetrical follow up visit. Patient reports no complaints.     Her prenatal care is complicated by (and status) :    Patient Active Problem List   Diagnosis    Mixed hyperlipidemia    Major depressive disorder, recurrent episode, moderate degree    History of gestational diabetes in prior pregnancy, currently pregnant    Supervision of other normal pregnancy, antepartum    Rubella non-immune status, antepartum       Flu Status: Desires at future appt  Covid Status:    ROS -   Patient Reports : No Problems  Patient Denies: Loss of Fluid and Vaginal Spotting  Fetal Movement : normal  All other systems reviewed and are negative.       The additional following portions of the patient's history were reviewed and updated as appropriate: allergies, current medications, past family history, past medical history, past social history, past surgical history, and problem list.    I have reviewed and agree with the HPI, ROS, and historical information as entered above. Fela Drake, DO    /62   Wt 67.4 kg (148 lb 9.6 oz)   LMP 2023   BMI 26.32 kg/m²       EXAM:     FHT: 146 BPM   Uterine Size: size equals dates  Pelvic Exam: No    Urine glucose/protein: See prenatal flowsheet  BS: Fastings all <87 and 2hr all <120 except 2 values (121 & 129)     Assessment and Plan  Diagnoses and all orders for this visit:    1. Supervision of other normal pregnancy, antepartum (Primary)  Overview:  Initial visit:  PNL: 2023   PAP/GC/CT/Urine culture: 2023   Blood type: O+  Hx of HSV?:  no     Genetic testin2023 NIPS and CF    Vaccinations:  COVID: 2023 not vaccinated     24-28 weeks:  1hr GCT:  Hct/Plt:  Tdap Rx  Rhogam indicated:      Third Trimester:  GBS  Breast pump:    Ultrasound:  Dating:  L = 9 week U/S   Anatomy: 23: KRYS confirmed SIUP with cardiac activity @154.  All anatomy visualized and appears nml.  Post placenta grade 1 vertex  Follow up:     PLAN:       Orders:  -     POC Urinalysis Dipstick         Pregnancy at 33w4d  Fetal status reassuring.   Activity and Exercise discussed.  Return in about 2 weeks (around 1/1/2024).  [place] Drake,   12/18/2023

## 2024-01-04 NOTE — PROGRESS NOTES
Routine Prenatal Visit     Subjective  Kenzie Marcos is a 33 y.o.  at 36w1d here for her routine OB visit.   She is taking her prenatal vitamins.Reports no loss of fluid or vaginal bleeding. Patient doing well without any complaints. Pregnancy complicated by:     Patient Active Problem List   Diagnosis    Mixed hyperlipidemia    Major depressive disorder, recurrent episode, moderate degree    History of gestational diabetes in prior pregnancy, currently pregnant    Supervision of other normal pregnancy, antepartum    Rubella non-immune status, antepartum    Diet controlled gestational diabetes mellitus (GDM)         OB History    Para Term  AB Living   2 1 1     1   SAB IAB Ectopic Molar Multiple Live Births             1      # Outcome Date GA Lbr Cristian/2nd Weight Sex Delivery Anes PTL Lv   2 Current            1 Term 21 39w4d  2863 g (6 lb 5 oz) M Vag-Spont   [place]           ROS:   General ROS: negative for - chills or fatigue  Respiratory ROS: negative for - cough or hemoptysis  Cardiovascular ROS: negative for - chest pain or dyspnea on exertion  Genito-Urinary ROS: negative for  change in urinary stream, vaginal discharge   Musculoskeletal ROS: negative for - gait disturbance or joint pain  Dermatological ROS: negative for acne,  dry skin or itching    Objective  Physical Exam:   Vitals:    24 0853   BP: 116/66       Uterine Size: size equals dates  FHT: 110-160 BPM    General appearance - alert, well appearing, and in no distress  Mental status - alert, oriented to person, place, and time  Abdomen- Soft, Gravid uterus, non-tender to palpation  Musculoskeletal: negative for - gait disturbance or joint pain  Extremeties: negative swelling or cyanosis   Dermatological: negative rashes or skin lesions   GBS RV swab performed today    Assessment/Plan:   Diagnoses and all orders for this visit:    1. Supervision of other normal pregnancy, antepartum (Primary)  Assessment &  Plan:  Initial visit:  PNL: 2023   PAP/GC/CT/Urine culture: 2023   Blood type: O+  Hx of HSV?:  no     Genetic testin2023 NIPS and CF    Vaccinations:  COVID: 2023 not vaccinated     24-28 weeks:  1hr GCT:157, nomrla 3 hr GTT  Hemoglobin/Plt:11/191  Tdap Rx-given, Rsv rx given   Rhogam indicated:      Third Trimester:  GBS-obtained 24  Breast pump:    Ultrasound:  Dating:  L = 9 week U/S   Anatomy: 23: KRYS confirmed SIUP with cardiac activity @154. All anatomy visualized and appears nml.  Post placenta grade 1 vertex  Follow up: US 23- EFW: 2200 g (4 pounds 14 ounces) 39th percentile AC: 52nd percentile, placental Location posterior placental Grade 1,Fetal Position: Vertex ,ELIZABETH: 11.6 cm     PROB/PLAN:  Hx of Gestational diabetes G1  GDMA-1, ASA 81mg daily, growth every 3-4 weeks, NST weekly 32 weeks     Orders:  -     POC Urinalysis Dipstick  -     CBC (No Diff)  -     Group B Strep (Molecular) - Swab, Vaginal/Rectum    2. Rubella non-immune status, antepartum    3. History of gestational diabetes in prior pregnancy, currently pregnant    4. Diet controlled gestational diabetes mellitus (GDM), antepartum  -     US Ob 14 + Weeks Single or First Gestation; Future  -     Fetal Nonstress Test; Standing    Other orders  -     Cancel: US Ob 14 + Weeks Single or First Gestation; Future            Counseling:   OB precautions, leaking, VB, meng moran vs PTL/Labor  FKC  HTN precautions reviewed: HA, vision change, RUQ/epigastric pain, edema  Round Ligament Pain:  The uterus has several ligaments which provide support and keep the uterus in place. As the  uterus grows these ligaments are pulled and stretched which often causes sharp stabbing like pain in the inguinal area.   You may find a pregnancy support band helpful. Changing positions may also help. Yoga is a great way to cope with round ligament and low back pain in pregnancy.    Massage may also help with low back pain    Things to Consider at this Point in your Pregnancy:  Some women experience swelling in their feet during pregnancy. Compression stockings may help  Drink plenty of water and stay active   Make sure you are eating frequent small meals, nuts are a wonderful snack to keep with you            Return in about 1 week (around 1/12/2024).      We have gone over prenatal care to include the timing and content of visits. I informed her how to contact the office and/or on call person in the event of any problems and encouraged her to do so when she feels it is necessary.  We then spent time answering her questions which she indicated were answered to her satisfaction.    Rochelle Rivera,   1/5/2024 09:13 EST

## 2024-01-05 ENCOUNTER — ROUTINE PRENATAL (OUTPATIENT)
Dept: OBSTETRICS AND GYNECOLOGY | Facility: CLINIC | Age: 34
End: 2024-01-05
Payer: COMMERCIAL

## 2024-01-05 VITALS — DIASTOLIC BLOOD PRESSURE: 66 MMHG | WEIGHT: 150.4 LBS | BODY MASS INDEX: 26.64 KG/M2 | SYSTOLIC BLOOD PRESSURE: 116 MMHG

## 2024-01-05 DIAGNOSIS — Z28.39 RUBELLA NON-IMMUNE STATUS, ANTEPARTUM: ICD-10-CM

## 2024-01-05 DIAGNOSIS — O09.299 HISTORY OF GESTATIONAL DIABETES IN PRIOR PREGNANCY, CURRENTLY PREGNANT: ICD-10-CM

## 2024-01-05 DIAGNOSIS — Z34.80 SUPERVISION OF OTHER NORMAL PREGNANCY, ANTEPARTUM: Primary | ICD-10-CM

## 2024-01-05 DIAGNOSIS — O09.899 RUBELLA NON-IMMUNE STATUS, ANTEPARTUM: ICD-10-CM

## 2024-01-05 DIAGNOSIS — O24.410 DIET CONTROLLED GESTATIONAL DIABETES MELLITUS (GDM), ANTEPARTUM: ICD-10-CM

## 2024-01-05 DIAGNOSIS — Z86.32 HISTORY OF GESTATIONAL DIABETES IN PRIOR PREGNANCY, CURRENTLY PREGNANT: ICD-10-CM

## 2024-01-05 LAB
DEPRECATED RDW RBC AUTO: 43.3 FL (ref 37–54)
ERYTHROCYTE [DISTWIDTH] IN BLOOD BY AUTOMATED COUNT: 13 % (ref 12.3–15.4)
GLUCOSE UR STRIP-MCNC: NEGATIVE MG/DL
HCT VFR BLD AUTO: 33.3 % (ref 34–46.6)
HGB BLD-MCNC: 11.9 G/DL (ref 12–15.9)
MCH RBC QN AUTO: 32.4 PG (ref 26.6–33)
MCHC RBC AUTO-ENTMCNC: 35.7 G/DL (ref 31.5–35.7)
MCV RBC AUTO: 90.7 FL (ref 79–97)
PLATELET # BLD AUTO: 147 10*3/MM3 (ref 140–450)
PMV BLD AUTO: 10.8 FL (ref 6–12)
PROT UR STRIP-MCNC: NEGATIVE MG/DL
RBC # BLD AUTO: 3.67 10*6/MM3 (ref 3.77–5.28)
WBC NRBC COR # BLD AUTO: 8.74 10*3/MM3 (ref 3.4–10.8)

## 2024-01-05 PROCEDURE — 87653 STREP B DNA AMP PROBE: CPT | Performed by: OBSTETRICS & GYNECOLOGY

## 2024-01-05 PROCEDURE — 85027 COMPLETE CBC AUTOMATED: CPT | Performed by: OBSTETRICS & GYNECOLOGY

## 2024-01-05 NOTE — PATIENT INSTRUCTIONS
Venipuncture Blood Specimen Collection  Venipuncture performed in left arm by Sherin Taylor with good hemostasis. Patient tolerated the procedure well without complications.   01/05/24   Sherin Taylor

## 2024-01-05 NOTE — ASSESSMENT & PLAN NOTE
Initial visit:  PNL: 2023   PAP/GC/CT/Urine culture: 2023   Blood type: O+  Hx of HSV?:  no     Genetic testin2023 NIPS and CF    Vaccinations:  COVID: 2023 not vaccinated     24-28 weeks:  1hr GCT:157, nomrla 3 hr GTT  Hemoglobin/Plt:11/191  Tdap Rx-given, Rsv rx given   Rhogam indicated:      Third Trimester:  GBS-obtained 24  Breast pump:    Ultrasound:  Dating:  L = 9 week U/S   Anatomy: 23: KRYS confirmed SIUP with cardiac activity @154. All anatomy visualized and appears nml.  Post placenta grade 1 vertex  Follow up: US 23- EFW: 2200 g (4 pounds 14 ounces) 39th percentile AC: 52nd percentile, placental Location posterior placental Grade 1,Fetal Position: Vertex ,ELIZABETH: 11.6 cm     PROB/PLAN:  Hx of Gestational diabetes G1  GDMA-1, ASA 81mg daily, growth every 3-4 weeks, NST weekly 32 weeks

## 2024-01-07 PROBLEM — O99.113 GESTATIONAL THROMBOCYTOPENIA WITHOUT HEMORRHAGE IN THIRD TRIMESTER: Status: ACTIVE | Noted: 2024-01-07

## 2024-01-07 PROBLEM — D69.6 GESTATIONAL THROMBOCYTOPENIA WITHOUT HEMORRHAGE IN THIRD TRIMESTER: Status: ACTIVE | Noted: 2024-01-07

## 2024-01-07 LAB — GROUP B STREP, DNA: NEGATIVE

## 2024-01-09 ENCOUNTER — HOSPITAL ENCOUNTER (OUTPATIENT)
Facility: HOSPITAL | Age: 34
Discharge: HOME | End: 2024-01-09
Attending: OBSTETRICS & GYNECOLOGY | Admitting: OBSTETRICS & GYNECOLOGY
Payer: COMMERCIAL

## 2024-01-09 VITALS
DIASTOLIC BLOOD PRESSURE: 65 MMHG | RESPIRATION RATE: 18 BRPM | SYSTOLIC BLOOD PRESSURE: 118 MMHG | HEART RATE: 100 BPM | OXYGEN SATURATION: 97 %

## 2024-01-09 PROCEDURE — 59025 FETAL NON-STRESS TEST: CPT

## 2024-01-09 PROCEDURE — 59025 FETAL NON-STRESS TEST: CPT | Performed by: OBSTETRICS & GYNECOLOGY

## 2024-01-09 PROCEDURE — G0463 HOSPITAL OUTPT CLINIC VISIT: HCPCS

## 2024-01-09 NOTE — NON STRESS TEST
Obstetrical Non-stress Test Interpretation     Name:  Kenzie Marcos  MRN: [id number]    33 y.o. female  at 36w5d    Indication: NST/GDM DIET CONTROLLED      Fetal Assessment  Fetal Movement: active  Fetal HR Assessment Method: external  Fetal HR (beats/min): 140  Fetal HR Baseline: normal range  Fetal HR Variability: moderate (amplitude range 6 to 25 bpm)  Fetal HR Accelerations: lasting at least 15 seconds  Fetal HR Decelerations: absent    /65 (BP Location: Right arm, Patient Position: Sitting)   Pulse 100   Resp 18   LMP 2023   SpO2 97%     Reason for test: OB Triage (NST/GDM DIET CONTROLLED)  Date of Test: 2024  Time frame of test: 5902-9012  RN NST Interpretation: Reactive      Caity Kenny RN  2024  11:15 EST

## 2024-01-09 NOTE — SIGNIFICANT NOTE
01/09/24 1830   Nonstress Test   Reason for NST Diabetes  (gestational diabetes)   Acoustic Stimulator No   Uterine Irritability No   Contractions Not present   Fetal Assessment   Fetal Movement active   Fetal HR Assessment Method external   Fetal HR (beats/min) 140   Fetal HR Baseline normal range   Fetal HR Variability moderate (amplitude range 6 to 25 bpm)   Fetal HR Accelerations episodic;lasting at least 15 seconds;greater than/equal to 15 bpm   Fetal HR Decelerations absent   Sinusoidal Pattern Present absent   Fetal HR Tracing Category Category I   Interpretation A   Nonstress Test Interpretation A Reactive

## 2024-01-12 ENCOUNTER — ROUTINE PRENATAL (OUTPATIENT)
Dept: OBSTETRICS AND GYNECOLOGY | Facility: CLINIC | Age: 34
End: 2024-01-12
Payer: COMMERCIAL

## 2024-01-12 VITALS — SYSTOLIC BLOOD PRESSURE: 113 MMHG | BODY MASS INDEX: 26.22 KG/M2 | DIASTOLIC BLOOD PRESSURE: 72 MMHG | WEIGHT: 148 LBS

## 2024-01-12 DIAGNOSIS — Z34.80 SUPERVISION OF OTHER NORMAL PREGNANCY, ANTEPARTUM: Primary | ICD-10-CM

## 2024-01-12 DIAGNOSIS — O09.299 HISTORY OF GESTATIONAL DIABETES IN PRIOR PREGNANCY, CURRENTLY PREGNANT: ICD-10-CM

## 2024-01-12 DIAGNOSIS — Z86.32 HISTORY OF GESTATIONAL DIABETES IN PRIOR PREGNANCY, CURRENTLY PREGNANT: ICD-10-CM

## 2024-01-12 LAB
GLUCOSE UR STRIP-MCNC: NEGATIVE MG/DL
PROT UR STRIP-MCNC: NEGATIVE MG/DL

## 2024-01-13 NOTE — PROGRESS NOTES
OB FOLLOW UP  CC- Here for care of pregnancy        Kenzie Marcos is a 33 y.o.  37w2d patient being seen today for her obstetrical follow up visit. Patient reports no complaints.     Her prenatal care is complicated by (and status) :    Patient Active Problem List   Diagnosis    Mixed hyperlipidemia    Major depressive disorder, recurrent episode, moderate degree    History of gestational diabetes in prior pregnancy, currently pregnant    Supervision of other normal pregnancy, antepartum    Rubella non-immune status, antepartum    Diet controlled gestational diabetes mellitus (GDM)    Gestational thrombocytopenia without hemorrhage in third trimester       Flu Status: Desires at future appt  Covid Status:    ROS -   Patient Reports : No Problems  Patient Denies: Loss of Fluid and Vaginal Spotting  Fetal Movement : normal  All other systems reviewed and are negative.       The additional following portions of the patient's history were reviewed and updated as appropriate: allergies, current medications, past family history, past medical history, past social history, past surgical history, and problem list.    I have reviewed and agree with the HPI, ROS, and historical information as entered above. Fela Drake, DO    /72   Wt 67.1 kg (148 lb)   LMP 2023   BMI 26.22 kg/m²       EXAM:     FHT: 149 BPM   Uterine Size: size equals dates  Pelvic Exam: No  BS log reviewed: All sugars nml except 1 value 124  Urine glucose/protein: See prenatal flowsheet       Assessment and Plan  Diagnoses and all orders for this visit:    1. Supervision of other normal pregnancy, antepartum (Primary)  Overview:  Initial visit:  PNL: 2023   PAP/GC/CT/Urine culture: 2023   Blood type: O+  Hx of HSV?:  no     Genetic testin2023 NIPS and CF    Vaccinations:  COVID: 2023 not vaccinated     24-28 weeks:  1hr GCT:157, failed 3 hr-GDMA-1  Hemoglobin/Plt:191  Tdap Rx-given, Rsv rx given   Rhogam  indicated:      Third Trimester:  GBS-obtained 1/5/24  Breast pump:    Ultrasound:  Dating:  L = 9 week U/S   Anatomy: 9/25/23: KRYS confirmed SIUP with cardiac activity @154. All anatomy visualized and appears nml.  Post placenta grade 1 vertex  Follow up: US 12/21/23- EFW: 2200 g (4 pounds 14 ounces) 39th percentile AC: 52nd percentile, placental Location posterior placental Grade 1,Fetal Position: Vertex ,ELIZABETH: 11.6 cm   Follow up US: 1/12/224: EFW:2919g(6lb 7oz) 37th% AC:66th%, ELIZABETH:13.5 cm, FHR:149, Vertex, Post placenta Grade 2    PROB/PLAN:  Hx of Gestational diabetes G1  GDMA-1, ASA 81mg daily, growth every 3-4 weeks, NST weekly 32 weeks        Orders:  -     POC Urinalysis Dipstick    2. History of gestational diabetes in prior pregnancy, currently pregnant  Overview:               Pregnancy at 37w2d  Fetal status reassuring.   Activity and Exercise discussed.  Return in about 1 week (around 1/19/2024).  Kick counts bid  Labor precautions    Fela Drake DO  01/12/2024

## 2024-01-16 ENCOUNTER — HOSPITAL ENCOUNTER (OUTPATIENT)
Facility: HOSPITAL | Age: 34
Discharge: HOME | End: 2024-01-16
Attending: STUDENT IN AN ORGANIZED HEALTH CARE EDUCATION/TRAINING PROGRAM | Admitting: STUDENT IN AN ORGANIZED HEALTH CARE EDUCATION/TRAINING PROGRAM
Payer: COMMERCIAL

## 2024-01-16 ENCOUNTER — HOSPITAL ENCOUNTER (OUTPATIENT)
Dept: LABOR AND DELIVERY | Facility: HOSPITAL | Age: 34
Discharge: HOME | End: 2024-01-16
Payer: COMMERCIAL

## 2024-01-16 VITALS — HEART RATE: 101 BPM | DIASTOLIC BLOOD PRESSURE: 71 MMHG | OXYGEN SATURATION: 99 % | SYSTOLIC BLOOD PRESSURE: 113 MMHG

## 2024-01-16 PROCEDURE — 59025 FETAL NON-STRESS TEST: CPT

## 2024-01-16 NOTE — NON STRESS TEST
Obstetrical Non-stress Test Interpretation     Name:  Kenzie Marcos  MRN: [id number]    33 y.o. female  at 37w5d    Indication: GDM      Fetal Assessment  Fetal Movement: active  Fetal HR Assessment Method: external  Fetal HR (beats/min): 140  Fetal HR Baseline: normal range  Fetal HR Variability: moderate (amplitude range 6 to 25 bpm)  Fetal HR Accelerations: episodic, greater than/equal to 15 bpm, lasting at least 15 seconds  Fetal HR Decelerations: variable  Sinusoidal Pattern Present: absent  Fetal HR Tracing Category: Category I    Pulse 102   LMP 2023   SpO2 99%     Reason for test: Other (Comment) (GDM)  Date of Test: 2024  Time frame of test: 8008-2291  RN NST Interpretation: Reactive      Ashia Vieira RN  2024  12:52 EST

## 2024-01-16 NOTE — NON STRESS TEST
Obstetrical Non-stress Test Interpretation     Name:  Kenzie Marcos  MRN: [id number]    33 y.o. female  at 37w5d    Indication: GDM      Fetal Assessment  Fetal Movement: active  Fetal HR Assessment Method: external  Fetal HR (beats/min): 125  Fetal HR Baseline: normal range  Fetal HR Variability: moderate (amplitude range 6 to 25 bpm)  Fetal HR Accelerations: greater than/equal to 15 bpm, lasting at least 15 seconds, episodic  Fetal HR Decelerations: absent  Sinusoidal Pattern Present: absent  Fetal HR Tracing Category: Category I    LMP 2023     Reason for test: Other (Comment) (GDM)  Date of Test: 2024  Time frame of test: 5509-3828  RN NST Interpretation: Reactive      Ashia Vieira RN  2024  11:35 EST

## 2024-01-17 ENCOUNTER — ROUTINE PRENATAL (OUTPATIENT)
Dept: OBSTETRICS AND GYNECOLOGY | Facility: CLINIC | Age: 34
End: 2024-01-17
Payer: COMMERCIAL

## 2024-01-17 VITALS — BODY MASS INDEX: 26.93 KG/M2 | SYSTOLIC BLOOD PRESSURE: 130 MMHG | WEIGHT: 152 LBS | DIASTOLIC BLOOD PRESSURE: 74 MMHG

## 2024-01-17 DIAGNOSIS — Z28.39 RUBELLA NON-IMMUNE STATUS, ANTEPARTUM: ICD-10-CM

## 2024-01-17 DIAGNOSIS — O09.299 HISTORY OF GESTATIONAL DIABETES IN PRIOR PREGNANCY, CURRENTLY PREGNANT: ICD-10-CM

## 2024-01-17 DIAGNOSIS — O09.899 RUBELLA NON-IMMUNE STATUS, ANTEPARTUM: ICD-10-CM

## 2024-01-17 DIAGNOSIS — O99.113 GESTATIONAL THROMBOCYTOPENIA WITHOUT HEMORRHAGE IN THIRD TRIMESTER: ICD-10-CM

## 2024-01-17 DIAGNOSIS — Z86.32 HISTORY OF GESTATIONAL DIABETES IN PRIOR PREGNANCY, CURRENTLY PREGNANT: ICD-10-CM

## 2024-01-17 DIAGNOSIS — D69.6 GESTATIONAL THROMBOCYTOPENIA WITHOUT HEMORRHAGE IN THIRD TRIMESTER: ICD-10-CM

## 2024-01-17 DIAGNOSIS — Z34.80 SUPERVISION OF OTHER NORMAL PREGNANCY, ANTEPARTUM: Primary | ICD-10-CM

## 2024-01-17 DIAGNOSIS — O24.410 DIET CONTROLLED GESTATIONAL DIABETES MELLITUS (GDM), ANTEPARTUM: ICD-10-CM

## 2024-01-17 LAB
GLUCOSE UR STRIP-MCNC: NEGATIVE MG/DL
PROT UR STRIP-MCNC: NEGATIVE MG/DL

## 2024-01-17 NOTE — PROGRESS NOTES
Routine Prenatal Visit     Subjective  Kenzie Marcos is a 33 y.o.  at 37w6d here for her routine OB visit.   She is taking her prenatal vitamins.Reports no loss of fluid or vaginal bleeding. Patient doing well without any complaints. Pregnancy complicated by:     Patient Active Problem List   Diagnosis    Mixed hyperlipidemia    Major depressive disorder, recurrent episode, moderate degree    History of gestational diabetes in prior pregnancy, currently pregnant    Supervision of other normal pregnancy, antepartum    Rubella non-immune status, antepartum    Diet controlled gestational diabetes mellitus (GDM)    Gestational thrombocytopenia without hemorrhage in third trimester         OB History    Para Term  AB Living   2 1 1     1   SAB IAB Ectopic Molar Multiple Live Births             1      # Outcome Date GA Lbr Cristian/2nd Weight Sex Delivery Anes PTL Lv   2 Current            1 Term 21 39w4d  2863 g (6 lb 5 oz) M Vag-Spont   [place]           ROS:   General ROS: negative for - chills or fatigue  Respiratory ROS: negative for - cough or hemoptysis  Cardiovascular ROS: negative for - chest pain or dyspnea on exertion  Genito-Urinary ROS: negative for  change in urinary stream, vaginal discharge   Musculoskeletal ROS: negative for - gait disturbance or joint pain  Dermatological ROS: negative for acne,  dry skin or itching    Objective  Physical Exam:   Vitals:    24 1100   BP: 130/74       Uterine Size: size equals dates  FHT: 110-160 BPM    General appearance - alert, well appearing, and in no distress  Mental status - alert, oriented to person, place, and time  Abdomen- Soft, Gravid uterus, non-tender to palpation  Musculoskeletal: negative for - gait disturbance or joint pain  Extremeties: negative swelling or cyanosis   Dermatological: negative rashes or skin lesions   3/40%/-3/soft/mid    Assessment/Plan:   Diagnoses and all orders for this visit:    1. Supervision of other normal  pregnancy, antepartum (Primary)  -     POC Urinalysis Dipstick    2. Gestational thrombocytopenia without hemorrhage in third trimester    3. Diet controlled gestational diabetes mellitus (GDM), antepartum    4. Rubella non-immune status, antepartum    5. History of gestational diabetes in prior pregnancy, currently pregnant            Counseling:   OB precautions, leaking, VB, meng moran vs PTL/Labor  FKC  HTN precautions reviewed: HA, vision change, RUQ/epigastric pain, edema  IOL scheduled  Round Ligament Pain:  The uterus has several ligaments which provide support and keep the uterus in place. As the  uterus grows these ligaments are pulled and stretched which often causes sharp stabbing like pain in the inguinal area.   You may find a pregnancy support band helpful. Changing positions may also help. Yoga is a great way to cope with round ligament and low back pain in pregnancy.    Massage may also help with low back pain   Things to Consider at this Point in your Pregnancy:  Some women experience swelling in their feet during pregnancy. Compression stockings may help  Drink plenty of water and stay active   Make sure you are eating frequent small meals, nuts are a wonderful snack to keep with you            Return in about 1 week (around 1/24/2024) for Routine OB visit.      We have gone over prenatal care to include the timing and content of visits. I informed her how to contact the office and/or on call person in the event of any problems and encouraged her to do so when she feels it is necessary.  We then spent time answering her questions which she indicated were answered to her satisfaction.    Rochelle Rivera DO  1/17/2024 11:25 EST

## 2024-01-23 ENCOUNTER — HOSPITAL ENCOUNTER (INPATIENT)
Facility: HOSPITAL | Age: 34
LOS: 2 days | Discharge: HOME | End: 2024-01-25
Attending: OBSTETRICS & GYNECOLOGY | Admitting: OBSTETRICS & GYNECOLOGY
Payer: COMMERCIAL

## 2024-01-23 ENCOUNTER — HOSPITAL ENCOUNTER (OUTPATIENT)
Dept: LABOR AND DELIVERY | Facility: HOSPITAL | Age: 34
Discharge: HOME | End: 2024-01-23
Payer: COMMERCIAL

## 2024-01-23 ENCOUNTER — ANESTHESIA EVENT (OUTPATIENT)
Dept: LABOR AND DELIVERY | Facility: HOSPITAL | Age: 34
End: 2024-01-23
Payer: COMMERCIAL

## 2024-01-23 ENCOUNTER — ANESTHESIA (OUTPATIENT)
Dept: LABOR AND DELIVERY | Facility: HOSPITAL | Age: 34
End: 2024-01-23
Payer: COMMERCIAL

## 2024-01-23 PROBLEM — Z34.90 ENCOUNTER FOR INDUCTION OF LABOR: Status: ACTIVE | Noted: 2024-01-23

## 2024-01-23 LAB
ABO GROUP BLD: NORMAL
AMPHET+METHAMPHET UR QL: NEGATIVE
BARBITURATES UR QL SCN: NEGATIVE
BENZODIAZ UR QL SCN: NEGATIVE
BLD GP AB SCN SERPL QL: NEGATIVE
CANNABINOIDS SERPL QL: NEGATIVE
COCAINE UR QL: NEGATIVE
DEPRECATED RDW RBC AUTO: 46.1 FL (ref 37–54)
ERYTHROCYTE [DISTWIDTH] IN BLOOD BY AUTOMATED COUNT: 13.6 % (ref 12.3–15.4)
FENTANYL UR-MCNC: NEGATIVE NG/ML
GLUCOSE BLDC GLUCOMTR-MCNC: 100 MG/DL (ref 70–99)
GLUCOSE BLDC GLUCOMTR-MCNC: 71 MG/DL (ref 70–99)
HCT VFR BLD AUTO: 36.8 % (ref 34–46.6)
HGB BLD-MCNC: 12.8 G/DL (ref 12–15.9)
MCH RBC QN AUTO: 31.8 PG (ref 26.6–33)
MCHC RBC AUTO-ENTMCNC: 34.8 G/DL (ref 31.5–35.7)
MCV RBC AUTO: 91.3 FL (ref 79–97)
METHADONE UR QL SCN: NEGATIVE
OPIATES UR QL: NEGATIVE
OXYCODONE UR QL SCN: NEGATIVE
PLATELET # BLD AUTO: 164 10*3/MM3 (ref 140–450)
PMV BLD AUTO: 10.5 FL (ref 6–12)
RBC # BLD AUTO: 4.03 10*6/MM3 (ref 3.77–5.28)
RH BLD: POSITIVE
T PALLIDUM IGG SER QL: NORMAL
T&S EXPIRATION DATE: NORMAL
WBC NRBC COR # BLD AUTO: 7.42 10*3/MM3 (ref 3.4–10.8)

## 2024-01-23 PROCEDURE — 80307 DRUG TEST PRSMV CHEM ANLYZR: CPT | Performed by: OBSTETRICS & GYNECOLOGY

## 2024-01-23 PROCEDURE — 25010000002 FENTANYL CITRATE (PF) 50 MCG/ML SOLUTION: Performed by: NURSE ANESTHETIST, CERTIFIED REGISTERED

## 2024-01-23 PROCEDURE — 82948 REAGENT STRIP/BLOOD GLUCOSE: CPT

## 2024-01-23 PROCEDURE — 86900 BLOOD TYPING SEROLOGIC ABO: CPT | Performed by: OBSTETRICS & GYNECOLOGY

## 2024-01-23 PROCEDURE — G0463 HOSPITAL OUTPT CLINIC VISIT: HCPCS

## 2024-01-23 PROCEDURE — 25010000002 ROPIVACAINE PER 1 MG: Performed by: NURSE ANESTHETIST, CERTIFIED REGISTERED

## 2024-01-23 PROCEDURE — 86780 TREPONEMA PALLIDUM: CPT | Performed by: OBSTETRICS & GYNECOLOGY

## 2024-01-23 PROCEDURE — 59025 FETAL NON-STRESS TEST: CPT

## 2024-01-23 PROCEDURE — C1755 CATHETER, INTRASPINAL: HCPCS | Performed by: NURSE ANESTHETIST, CERTIFIED REGISTERED

## 2024-01-23 PROCEDURE — 25810000003 LACTATED RINGERS PER 1000 ML: Performed by: OBSTETRICS & GYNECOLOGY

## 2024-01-23 PROCEDURE — 86901 BLOOD TYPING SEROLOGIC RH(D): CPT | Performed by: OBSTETRICS & GYNECOLOGY

## 2024-01-23 PROCEDURE — 86850 RBC ANTIBODY SCREEN: CPT | Performed by: OBSTETRICS & GYNECOLOGY

## 2024-01-23 PROCEDURE — 3E033VJ INTRODUCTION OF OTHER HORMONE INTO PERIPHERAL VEIN, PERCUTANEOUS APPROACH: ICD-10-PCS | Performed by: OBSTETRICS & GYNECOLOGY

## 2024-01-23 PROCEDURE — 10907ZC DRAINAGE OF AMNIOTIC FLUID, THERAPEUTIC FROM PRODUCTS OF CONCEPTION, VIA NATURAL OR ARTIFICIAL OPENING: ICD-10-PCS | Performed by: OBSTETRICS & GYNECOLOGY

## 2024-01-23 PROCEDURE — 85027 COMPLETE CBC AUTOMATED: CPT | Performed by: OBSTETRICS & GYNECOLOGY

## 2024-01-23 RX ORDER — MISOPROSTOL 200 UG/1
800 TABLET ORAL AS NEEDED
Status: DISCONTINUED | OUTPATIENT
Start: 2024-01-23 | End: 2024-01-24 | Stop reason: HOSPADM

## 2024-01-23 RX ORDER — PROMETHAZINE HYDROCHLORIDE 25 MG/1
25 TABLET ORAL EVERY 6 HOURS PRN
Status: DISCONTINUED | OUTPATIENT
Start: 2024-01-23 | End: 2024-01-24 | Stop reason: HOSPADM

## 2024-01-23 RX ORDER — OXYTOCIN/0.9 % SODIUM CHLORIDE 30/500 ML
2 PLASTIC BAG, INJECTION (ML) INTRAVENOUS
Status: DISCONTINUED | OUTPATIENT
Start: 2024-01-23 | End: 2024-01-24 | Stop reason: HOSPADM

## 2024-01-23 RX ORDER — HYDROCODONE BITARTRATE AND ACETAMINOPHEN 5; 325 MG/1; MG/1
1 TABLET ORAL EVERY 4 HOURS PRN
Status: DISCONTINUED | OUTPATIENT
Start: 2024-01-23 | End: 2024-01-24 | Stop reason: HOSPADM

## 2024-01-23 RX ORDER — ONDANSETRON 4 MG/1
4 TABLET, ORALLY DISINTEGRATING ORAL EVERY 6 HOURS PRN
Status: DISCONTINUED | OUTPATIENT
Start: 2024-01-23 | End: 2024-01-24 | Stop reason: HOSPADM

## 2024-01-23 RX ORDER — ONDANSETRON 4 MG/1
4 TABLET, ORALLY DISINTEGRATING ORAL EVERY 6 HOURS PRN
Status: DISCONTINUED | OUTPATIENT
Start: 2024-01-23 | End: 2024-01-23 | Stop reason: SDUPTHER

## 2024-01-23 RX ORDER — LIDOCAINE HYDROCHLORIDE 10 MG/ML
0.5 INJECTION, SOLUTION INFILTRATION; PERINEURAL ONCE AS NEEDED
Status: DISCONTINUED | OUTPATIENT
Start: 2024-01-23 | End: 2024-01-24 | Stop reason: HOSPADM

## 2024-01-23 RX ORDER — FAMOTIDINE 20 MG/1
20 TABLET, FILM COATED ORAL ONCE AS NEEDED
Status: DISCONTINUED | OUTPATIENT
Start: 2024-01-23 | End: 2024-01-24 | Stop reason: HOSPADM

## 2024-01-23 RX ORDER — ACETAMINOPHEN 325 MG/1
650 TABLET ORAL ONCE AS NEEDED
Status: DISCONTINUED | OUTPATIENT
Start: 2024-01-23 | End: 2024-01-24 | Stop reason: HOSPADM

## 2024-01-23 RX ORDER — OXYTOCIN/0.9 % SODIUM CHLORIDE 30/500 ML
999 PLASTIC BAG, INJECTION (ML) INTRAVENOUS ONCE
Status: DISCONTINUED | OUTPATIENT
Start: 2024-01-23 | End: 2024-01-24 | Stop reason: HOSPADM

## 2024-01-23 RX ORDER — HYDROCODONE BITARTRATE AND ACETAMINOPHEN 10; 325 MG/1; MG/1
1 TABLET ORAL EVERY 4 HOURS PRN
Status: DISCONTINUED | OUTPATIENT
Start: 2024-01-23 | End: 2024-01-24 | Stop reason: HOSPADM

## 2024-01-23 RX ORDER — EPHEDRINE SULFATE 50 MG/ML
5 INJECTION, SOLUTION INTRAVENOUS
Status: DISCONTINUED | OUTPATIENT
Start: 2024-01-23 | End: 2024-01-24 | Stop reason: HOSPADM

## 2024-01-23 RX ORDER — SODIUM CHLORIDE 0.9 % (FLUSH) 0.9 %
10 SYRINGE (ML) INJECTION AS NEEDED
Status: DISCONTINUED | OUTPATIENT
Start: 2024-01-23 | End: 2024-01-24 | Stop reason: HOSPADM

## 2024-01-23 RX ORDER — MAGNESIUM CARB/ALUMINUM HYDROX 105-160MG
30 TABLET,CHEWABLE ORAL ONCE
Status: DISCONTINUED | OUTPATIENT
Start: 2024-01-23 | End: 2024-01-24 | Stop reason: HOSPADM

## 2024-01-23 RX ORDER — CITRIC ACID/SODIUM CITRATE 334-500MG
30 SOLUTION, ORAL ORAL ONCE AS NEEDED
Status: DISCONTINUED | OUTPATIENT
Start: 2024-01-23 | End: 2024-01-24 | Stop reason: HOSPADM

## 2024-01-23 RX ORDER — SODIUM CHLORIDE, SODIUM LACTATE, POTASSIUM CHLORIDE, CALCIUM CHLORIDE 600; 310; 30; 20 MG/100ML; MG/100ML; MG/100ML; MG/100ML
125 INJECTION, SOLUTION INTRAVENOUS CONTINUOUS
Status: DISCONTINUED | OUTPATIENT
Start: 2024-01-23 | End: 2024-01-24

## 2024-01-23 RX ORDER — FENTANYL CITRATE 50 UG/ML
INJECTION, SOLUTION INTRAMUSCULAR; INTRAVENOUS
Status: COMPLETED | OUTPATIENT
Start: 2024-01-23 | End: 2024-01-23

## 2024-01-23 RX ORDER — ROPIVACAINE HYDROCHLORIDE 2 MG/ML
INJECTION, SOLUTION EPIDURAL; INFILTRATION; PERINEURAL
Status: COMPLETED | OUTPATIENT
Start: 2024-01-23 | End: 2024-01-23

## 2024-01-23 RX ORDER — OXYTOCIN/0.9 % SODIUM CHLORIDE 30/500 ML
250 PLASTIC BAG, INJECTION (ML) INTRAVENOUS CONTINUOUS
Status: ACTIVE | OUTPATIENT
Start: 2024-01-23 | End: 2024-01-23

## 2024-01-23 RX ORDER — LIDOCAINE HYDROCHLORIDE AND EPINEPHRINE 15; 5 MG/ML; UG/ML
INJECTION, SOLUTION EPIDURAL
Status: COMPLETED | OUTPATIENT
Start: 2024-01-23 | End: 2024-01-23

## 2024-01-23 RX ORDER — FAMOTIDINE 10 MG/ML
20 INJECTION, SOLUTION INTRAVENOUS 2 TIMES DAILY PRN
Status: DISCONTINUED | OUTPATIENT
Start: 2024-01-23 | End: 2024-01-24 | Stop reason: HOSPADM

## 2024-01-23 RX ORDER — SODIUM CHLORIDE 0.9 % (FLUSH) 0.9 %
10 SYRINGE (ML) INJECTION EVERY 12 HOURS SCHEDULED
Status: DISCONTINUED | OUTPATIENT
Start: 2024-01-23 | End: 2024-01-24 | Stop reason: HOSPADM

## 2024-01-23 RX ORDER — SODIUM CHLORIDE 9 MG/ML
40 INJECTION, SOLUTION INTRAVENOUS AS NEEDED
Status: DISCONTINUED | OUTPATIENT
Start: 2024-01-23 | End: 2024-01-24 | Stop reason: HOSPADM

## 2024-01-23 RX ORDER — ONDANSETRON 2 MG/ML
4 INJECTION INTRAMUSCULAR; INTRAVENOUS EVERY 6 HOURS PRN
Status: DISCONTINUED | OUTPATIENT
Start: 2024-01-23 | End: 2024-01-23 | Stop reason: SDUPTHER

## 2024-01-23 RX ORDER — FENTANYL CITRATE 50 UG/ML
INJECTION, SOLUTION INTRAMUSCULAR; INTRAVENOUS
Status: COMPLETED
Start: 2024-01-23 | End: 2024-01-23

## 2024-01-23 RX ORDER — ACETAMINOPHEN 325 MG/1
650 TABLET ORAL EVERY 4 HOURS PRN
Status: DISCONTINUED | OUTPATIENT
Start: 2024-01-23 | End: 2024-01-24 | Stop reason: HOSPADM

## 2024-01-23 RX ORDER — PROMETHAZINE HYDROCHLORIDE 12.5 MG/1
12.5 TABLET ORAL EVERY 6 HOURS PRN
Status: DISCONTINUED | OUTPATIENT
Start: 2024-01-23 | End: 2024-01-24 | Stop reason: HOSPADM

## 2024-01-23 RX ORDER — TERBUTALINE SULFATE 1 MG/ML
0.25 INJECTION, SOLUTION SUBCUTANEOUS AS NEEDED
Status: DISCONTINUED | OUTPATIENT
Start: 2024-01-23 | End: 2024-01-24 | Stop reason: HOSPADM

## 2024-01-23 RX ORDER — METHYLERGONOVINE MALEATE 0.2 MG/ML
200 INJECTION INTRAVENOUS ONCE AS NEEDED
Status: DISCONTINUED | OUTPATIENT
Start: 2024-01-23 | End: 2024-01-24 | Stop reason: HOSPADM

## 2024-01-23 RX ORDER — METOCLOPRAMIDE HYDROCHLORIDE 5 MG/ML
10 INJECTION INTRAMUSCULAR; INTRAVENOUS ONCE AS NEEDED
Status: DISCONTINUED | OUTPATIENT
Start: 2024-01-23 | End: 2024-01-24 | Stop reason: HOSPADM

## 2024-01-23 RX ORDER — ONDANSETRON 2 MG/ML
4 INJECTION INTRAMUSCULAR; INTRAVENOUS EVERY 6 HOURS PRN
Status: DISCONTINUED | OUTPATIENT
Start: 2024-01-23 | End: 2024-01-24 | Stop reason: HOSPADM

## 2024-01-23 RX ORDER — FAMOTIDINE 20 MG/1
20 TABLET, FILM COATED ORAL 2 TIMES DAILY PRN
Status: DISCONTINUED | OUTPATIENT
Start: 2024-01-23 | End: 2024-01-24 | Stop reason: HOSPADM

## 2024-01-23 RX ORDER — IBUPROFEN 800 MG/1
800 TABLET ORAL ONCE AS NEEDED
Status: DISCONTINUED | OUTPATIENT
Start: 2024-01-23 | End: 2024-01-24 | Stop reason: HOSPADM

## 2024-01-23 RX ORDER — MORPHINE SULFATE 5 MG/ML
5 INJECTION, SOLUTION INTRAMUSCULAR; INTRAVENOUS
Status: DISCONTINUED | OUTPATIENT
Start: 2024-01-23 | End: 2024-01-24 | Stop reason: HOSPADM

## 2024-01-23 RX ORDER — FAMOTIDINE 10 MG/ML
20 INJECTION, SOLUTION INTRAVENOUS ONCE AS NEEDED
Status: DISCONTINUED | OUTPATIENT
Start: 2024-01-23 | End: 2024-01-24 | Stop reason: HOSPADM

## 2024-01-23 RX ADMIN — ROPIVACAINE HYDROCHLORIDE 10 ML: 2 INJECTION, SOLUTION EPIDURAL; INFILTRATION; PERINEURAL at 22:24

## 2024-01-23 RX ADMIN — Medication 10 ML/HR: at 22:29

## 2024-01-23 RX ADMIN — SODIUM CHLORIDE, POTASSIUM CHLORIDE, SODIUM LACTATE AND CALCIUM CHLORIDE 125 ML/HR: 600; 310; 30; 20 INJECTION, SOLUTION INTRAVENOUS at 21:53

## 2024-01-23 RX ADMIN — Medication 2 MILLI-UNITS/MIN: at 18:16

## 2024-01-23 RX ADMIN — LIDOCAINE HYDROCHLORIDE AND EPINEPHRINE 3 ML: 15; 5 INJECTION, SOLUTION EPIDURAL at 22:19

## 2024-01-23 RX ADMIN — FENTANYL CITRATE 100 MCG: 50 INJECTION, SOLUTION INTRAMUSCULAR; INTRAVENOUS at 22:24

## 2024-01-23 RX ADMIN — SODIUM CHLORIDE, POTASSIUM CHLORIDE, SODIUM LACTATE AND CALCIUM CHLORIDE 125 ML/HR: 600; 310; 30; 20 INJECTION, SOLUTION INTRAVENOUS at 17:30

## 2024-01-23 NOTE — H&P
Obstetric History and Physical     Kenzie Marcos is a 33 y.o.  at 38w5d weeks EGA.  She presents for NST for GDMA-1 and was found to have Cat 2 strip with variable decels and one late deceleration after a contraction. Due to term with GDMA-1 the decision was made to admit for IOL. Patient denies any vaginal bleeding, loss of fluid or decreased fetal movements.  Her ACOG is reviewed and current.    Past Medical History:   Diagnosis Date    Abnormal Pap smear of cervix     Anxiety     Chlamydia     Depression     Gestational diabetes     HPV (human papilloma virus) infection     Urogenital trichomoniasis     Vitamin D deficiency        Past Surgical History:   Procedure Laterality Date    MOUTH SURGERY      WISDOM TOOTH EXTRACTION         Family History   Problem Relation Age of Onset    Hypertension Father     Diabetes Paternal Grandfather     Skin cancer Paternal Grandfather     Skin cancer Paternal Grandmother     Lung cancer Maternal Grandmother     COPD Maternal Grandfather     Heart disease Maternal Grandfather     Breast cancer Neg Hx     Ovarian cancer Neg Hx     Colon cancer Neg Hx     Uterine cancer Neg Hx        Social History     Tobacco Use    Smoking status: Never     Passive exposure: Yes    Smokeless tobacco: Never    Tobacco comments:     Light exposure to tobacco smoke   Vaping Use    Vaping Use: Never used   Substance Use Topics    Alcohol use: Not Currently     Alcohol/week: 4.0 - 5.0 standard drinks of alcohol     Types: 2 Glasses of wine, 2 - 3 Drinks containing 0.5 oz of alcohol per week    Drug use: Never       Prior to Admission Meds: (Last known outpatient meds at time of note signature)  Prior to Admission medications    Medication Sig Start Date End Date Taking? Authorizing Provider   Alcohol Swabs (Curity Alcohol Preps) 70 % pads Four times daily blood sugar testing, fasting and two hours after each meal 10/30/23   Fela Drake,    ferrous sulfate 325 (65 FE) MG tablet Take 1  tablet by mouth Daily With Breakfast. 12/13/23   Fela Drake, DO   glucose blood test strip Patient has the one touch glucometer. 10/30/23   Fela Drake, DO   Lancets (freestyle) lancets Four times daily blood sugar testing, fasting and two hours after each meal. Patient has the one touch glucometer. 10/30/23   Fela Drake DO   prenatal vitamin tablet Take  by mouth Daily.    Provider, MD Jero         ROS:    General ROS: negative for - chills or fatigue  Ophthalmic ROS: negative for - blurry vision or decreased vision  ENT ROS: negative for - epistaxis, headaches or hearing change  Allergy and Immunology ROS: negative for - hives or insect bite sensitivity  Hematological and Lymphatic ROS: negative for - bleeding problems or blood clots  Endocrine ROS: negative for - breast changes or galactorrhea  Breast ROS: negative for - galactorrhea or new or changing breast lumps  Respiratory ROS: negative for - cough or hemoptysis  Cardiovascular ROS: negative for - chest pain or dyspnea on exertion  Gastrointestinal ROS: negative for - abdominal pain or appetite loss  Genito-Urinary ROS: negative for - change in urinary stream, dysuria   Musculoskeletal ROS: negative for - gait disturbance or joint pain  Neurological ROS: negative for - behavioral changes or bowel and bladder control changes  Dermatological ROS: negative for acne and dry skin      Vitals:    01/23/24 1531   BP: 112/55   Pulse: 107   Resp:    SpO2:        Physical Exam   General- NAD, alert and oriented, appropriate  Psych- Normal mood, good memory  CV- Regular rhythm, no murnurs  Resp- CTA to bases, no wheezes  Abdomen- NABS, soft, non distended, non tender, no masses  Abdomen- Gravid, non tender  Fundus-  Size: consistent w dates.  Non tender.  Cvx- 3-4cm / 50% / -3 per RN   Presentation- VTX  Ext/DTRs- No edema    Fetal HR: Category I  Contractions: Irregular    Plans reviewed and discussed with Kenzie who is in  agreement.        ASSESSMENT:  38w5d GMDA-1, Cat 2 strip on NST   GBS: Negative      PLAN:  Admitted or IOL fro Cat 2 strip at term   Delivery: See labor orders, plan for     Plan of care NLT hospital course, R/B/A/potential SE, suspected length 24-48+hrs have been reviewed with patient and any family or friends present, questions answered to her/his/their satisfaction.  Pt desires to proceed as above.    Counseling:The patient was counseled on the risks, benefits and alternatives of Induction.  Risks reviewed, but are not limited to: bleeding, transfusion, fetal intolerance,  (possibly emergent),  and uterine rupture.  She declines expectant management or  and desires induction.  Reviewed risks w prematurity.  All her questions have been answered to her satisfaction and she desires to proceed.  Specifically with Cytotec, she understands that it is endorsed by the [employer] College [employer]GY[employer], but not FDA approved for the induction of labor.        Rochelle Rivera, DO

## 2024-01-23 NOTE — NON STRESS TEST
Obstetrical Non-stress Test Interpretation     Name:  Kenzie Marcos  MRN: [id number]    33 y.o. female  at 38w5d    Indication: Elevated blood pressure at school today      Fetal Assessment  Fetal Movement: active  Fetal HR Assessment Method: external  Fetal HR (beats/min): 145  Fetal HR Baseline: normal range  Fetal HR Variability: other (see comments) (appropriate for gestational age)  Fetal HR Accelerations: other (see comments) (appropriate for gestational age)  Fetal HR Decelerations: absent    /52 (BP Location: Left arm, Patient Position: Sitting)   Pulse 103   Resp 18   LMP 2023   SpO2 97%     Reason for test: OB Triage  Date of Test: 2024  Time frame of test: 6217-7661  RN NST Interpretation:  (appropriate for gestational age)      Caity Kenny RN  2024  16:10 EST

## 2024-01-24 PROBLEM — O36.8390 NON-REASSURING FETAL HEART TONES COMPLICATING PREGNANCY, ANTEPARTUM: Status: ACTIVE | Noted: 2024-01-24

## 2024-01-24 PROCEDURE — 59025 FETAL NON-STRESS TEST: CPT

## 2024-01-24 PROCEDURE — 51702 INSERT TEMP BLADDER CATH: CPT

## 2024-01-24 PROCEDURE — 59410 OBSTETRICAL CARE: CPT | Performed by: OBSTETRICS & GYNECOLOGY

## 2024-01-24 RX ORDER — ONDANSETRON 4 MG/1
4 TABLET, ORALLY DISINTEGRATING ORAL EVERY 8 HOURS PRN
Status: DISCONTINUED | OUTPATIENT
Start: 2024-01-24 | End: 2024-01-25 | Stop reason: HOSPADM

## 2024-01-24 RX ORDER — PRENATAL VIT/IRON FUM/FOLIC AC 27MG-0.8MG
1 TABLET ORAL DAILY
Status: DISCONTINUED | OUTPATIENT
Start: 2024-01-24 | End: 2024-01-25 | Stop reason: HOSPADM

## 2024-01-24 RX ORDER — CALCIUM CARBONATE 500 MG/1
2 TABLET, CHEWABLE ORAL 3 TIMES DAILY PRN
Status: DISCONTINUED | OUTPATIENT
Start: 2024-01-24 | End: 2024-01-25 | Stop reason: HOSPADM

## 2024-01-24 RX ORDER — DOCUSATE SODIUM 100 MG/1
100 CAPSULE, LIQUID FILLED ORAL DAILY
Status: DISCONTINUED | OUTPATIENT
Start: 2024-01-24 | End: 2024-01-25 | Stop reason: HOSPADM

## 2024-01-24 RX ORDER — ACETAMINOPHEN 325 MG/1
650 TABLET ORAL EVERY 6 HOURS PRN
Status: DISCONTINUED | OUTPATIENT
Start: 2024-01-24 | End: 2024-01-25 | Stop reason: HOSPADM

## 2024-01-24 RX ORDER — OXYTOCIN/0.9 % SODIUM CHLORIDE 30/500 ML
125 PLASTIC BAG, INJECTION (ML) INTRAVENOUS CONTINUOUS PRN
Status: DISCONTINUED | OUTPATIENT
Start: 2024-01-24 | End: 2024-01-25 | Stop reason: HOSPADM

## 2024-01-24 RX ORDER — BISACODYL 10 MG
10 SUPPOSITORY, RECTAL RECTAL DAILY PRN
Status: DISCONTINUED | OUTPATIENT
Start: 2024-01-25 | End: 2024-01-25 | Stop reason: HOSPADM

## 2024-01-24 RX ORDER — FERROUS SULFATE 325(65) MG
325 TABLET ORAL
Status: DISCONTINUED | OUTPATIENT
Start: 2024-01-24 | End: 2024-01-25 | Stop reason: HOSPADM

## 2024-01-24 RX ORDER — IBUPROFEN 600 MG/1
600 TABLET ORAL EVERY 6 HOURS PRN
Status: DISCONTINUED | OUTPATIENT
Start: 2024-01-24 | End: 2024-01-25 | Stop reason: HOSPADM

## 2024-01-24 RX ORDER — PROMETHAZINE HYDROCHLORIDE 12.5 MG/1
12.5 TABLET ORAL EVERY 4 HOURS PRN
Status: DISCONTINUED | OUTPATIENT
Start: 2024-01-24 | End: 2024-01-25 | Stop reason: HOSPADM

## 2024-01-24 RX ORDER — SODIUM CHLORIDE 0.9 % (FLUSH) 0.9 %
1-10 SYRINGE (ML) INJECTION AS NEEDED
Status: DISCONTINUED | OUTPATIENT
Start: 2024-01-24 | End: 2024-01-25 | Stop reason: HOSPADM

## 2024-01-24 RX ADMIN — VITAMIN A, VITAMIN C, VITAMIN D, VITAMIN E, THIAMINE, RIBOFLAVIN, NIACIN, VITAMIN B6, FOLIC ACID, VITAMIN B12, CALCIUM, IRON, ZINC, COPPER 1 TABLET: 4000; 120; 400; 22; 1.84; 3; 20; 10; 1; 12; 200; 27; 25; 2 TABLET ORAL at 08:09

## 2024-01-24 RX ADMIN — FERROUS SULFATE TAB 325 MG (65 MG ELEMENTAL FE) 325 MG: 325 (65 FE) TAB at 08:09

## 2024-01-24 RX ADMIN — IBUPROFEN 600 MG: 600 TABLET, FILM COATED ORAL at 14:33

## 2024-01-24 RX ADMIN — DOCUSATE SODIUM 100 MG: 100 CAPSULE, LIQUID FILLED ORAL at 08:09

## 2024-01-24 RX ADMIN — IBUPROFEN 600 MG: 600 TABLET, FILM COATED ORAL at 21:41

## 2024-01-24 RX ADMIN — IBUPROFEN 600 MG: 600 TABLET, FILM COATED ORAL at 08:09

## 2024-01-24 NOTE — ANESTHESIA PREPROCEDURE EVALUATION
Anesthesia Evaluation     Patient summary reviewed and Nursing notes reviewed   no history of anesthetic complications:                Airway   Mallampati: II  No difficulty expected  Dental - normal exam     Pulmonary - negative pulmonary ROS and normal exam    breath sounds clear to auscultation  Cardiovascular - normal exam    Rhythm: regular  Rate: normal    (+) hyperlipidemia      Neuro/Psych  (+) psychiatric history Depression  GI/Hepatic/Renal/Endo    (+) diabetes mellitus gestational    Musculoskeletal (-) negative ROS    Abdominal  - normal exam   Substance History - negative use     OB/GYN    (+) Pregnant        Other - negative ROS                   Anesthesia Plan    ASA 2     epidural       Anesthetic plan, risks, benefits, and alternatives have been provided, discussed and informed consent has been obtained with: patient.  Pre-procedure education provided  Plan discussed with CRNA.    CODE STATUS:    Code Status (Patient has no pulse and is not breathing): CPR (Attempt to Resuscitate)  Medical Interventions (Patient has pulse or is breathing): Full

## 2024-01-24 NOTE — PROGRESS NOTES
OB Intrapartum Note    Subjective: No complaints, Comfortable with epidural    Objective:  Baseline: Normal 110-160 bpm  Variability:   Moderate/Normal (amplitude 6-25 bpm)  Accelerations: Present (32 weeks+) 15 x 15 bpm  Decelerations: None  Contractions:  Regular    Cervical exam:    Dilation: 5cm    Effacement: 70%    Station: -2    Impression:    Category I  Reassuring fetus, AROM, Clear fluid    Plan:   Continue pitocin  Active labor positioning  Reviewed course/progress/plan with pt.  All questions answered to pts satisfaction.  Pt desires to proceed as outlined.        Electronically signed by Rochelle Rivera DO, 01/23/24, 11:06 PM EST.

## 2024-01-24 NOTE — L&D DELIVERY NOTE
Sebastián MarcosKoko [[id number]]      Labor Events     labor?: No   steroids?: None  Antibiotics during labor?: No  Sac identifier: Sac 1  Rupture date/time: 2024 225  Rupture type: artificial rupture of membranes, Intact  Fluid color: normal, clear  Fluid odor: None  Labor type: Induced Onset of Labor  Labor allowed to proceed with plans for an attempted vaginal birth?: Yes  Induction: Oxytocin  Induction date/time: 2024 1439  Induction indications: Gestational Diabetes  Complications: None       Labor Event Times    Labor onset date/time: 2024 181  Dilation complete date/time: 2024  Start pushing date/time: 2024       Anesthesia    Method: Epidural       Operative Delivery    Forceps attempted?: No  Vacuum extractor attempted?: No       Shoulder Dystocia    Shoulder dystocia present: No                              Presentation    Presentation: Vertex  Position: Occiput Anterior       [place] Delivery    Birth date/time: 2024 00:19:00  Delivery type: Vaginal, Spontaneous  Complications: None       Delivery Providers    Delivering clinician: Rochelle Rivera DO   Provider Role    Alexandra Kaiser RN Circulator    Samira Toledo RN Baby Nurse    Myah Deras RN Charge Nurse     Anesthesia Provider    Jaya Lester OB Tech          Cord    Vessels: 3 vessels  Complications: Nuchal  Nuchal intervention: reduced  Nuchal cord description: tight nuchal cord  Number of loops: 1  Delayed cord clamping?: Yes  Cord clamped date/time: 2024 0020  Gases sent?: No  Stem cell collection (by provider): No       Placenta    Placenta delivery date/time: 2024 0023  Placenta removal: Spontaneous  Placenta appearance: Intact  Placenta disposition: discarded       Resuscitation    Method: Suctioning, Tactile Stimulation, Dried        Apgars       Living status: Living      Apgars assigned by: RAFI TOLEDO RN          Apgar Component Scores       Component 1 min. 5 min.     "Skin color:  0  1     Heart rate:  2  2     Reflex irritability:  2  2     Muscle tone:  2  2     Respiratory effort:  2  2     Total:  8  9                Measurements    Weight: 3280 g  Weight (lbs): 7 lb 3.7 oz  Length: 48.3 cm  Length (in): 19\"       Lacerations    Episiotomy: None  Perineal laceration: None  Other lacerations?: No  Vaginal delivery est. blood loss (mL): 50       Vaginal Counts       Initial count personnel: DR. RIVERA      Initial count verified by: ABDELRAHMAN LAMB           Final count personnel: DR. RIVERA      Final count verified by: ABDELRAHMAN LAMB      Accurate final count?: Yes               Procedures    Procedures: None       Labor Length    1st stage: 5h 34m  2nd stage: 0h 29m                  VAGINAL DELIVERY NOTE          Date: 2024   Time:  12:19 AM   GA: 38w6d       Encounter for induction of labor    Non-reassuring fetal heart tones complicating pregnancy, antepartum        Infant: female  infant   3280 g (7 lb 3.7 oz)     APGARS: 8  @ 1 minute / 9  @ 5 minutes    Delivery:  Patient received epidural and progressed to complete. Patient delivered over intact perineum. Infant head delivered in OA position.No Nuchal cord. Infant shoulder and body delivered without difficulty. Infant was placed onto mothers abdomen. Delayed cord clamping for 1 minute. Cord was then clamped and cut. Cord blood obtained and sent.  Placenta delivered spontaneously and was intact, pitocin was started. Vagina was cleared of all clots and inspected for any lacerations. 50No laceration noted.  All sponges, needles and instruments were counted and correct.    EBL:  ml    Complications: None    Anesthesia:  Epidural    Episiotomy:  none    Lacerations:  none    See Delivery Summary for Apgar and Weight.    Rochelle Rivera DO  2024 01:36 EST          Electronically signed by Rochelle Rivera DO, 24, 12:30 AM EST.       [facility] LABOR AND DELIVERY  [address] [place] [address] " [place] [address]  Dept: [phone]  Loc: [phone]

## 2024-01-24 NOTE — ANESTHESIA PROCEDURE NOTES
Labor Epidural    Pre-sedation assessment completed: 1/23/2024 10:33 PM    Patient reassessed immediately prior to procedure    Patient location during procedure: OB  Start Time: 1/23/2024 10:15 PM  Stop Time: 1/23/2024 10:33 PM  Indication:at surgeon's request  Performed By  CRNA/CAA: Veena Pablo CRNA  Preanesthetic Checklist  Completed: patient identified, IV checked, site marked, risks and benefits discussed, surgical consent, monitors and equipment checked, pre-op evaluation and timeout performed  Prep:  Pt Position:sitting  Sterile Tech:cap, gloves and sterile barrier  Prep:povidone-iodine 7.5% surgical scrub  Monitoring:blood pressure monitoring and continuous pulse oximetry  Epidural Block Procedure:  Approach:midline  Guidance:landmark technique and palpation technique  Location:L4-L5  Needle Type:Tuohy  Needle Gauge:17 G  Loss of Resistance Medium: saline  Loss of Resistance: 5cm  Cath Depth at skin:9 cm  Paresthesia: none  Aspiration:negative  Test Dose:negative  Medication: ropivacaine (NAROPIN) 0.2 % injection - Injection   10 mL - 1/23/2024 10:24:00 PM  fentaNYL citrate (PF) (SUBLIMAZE) injection - Epidural   100 mcg - 1/23/2024 10:24:00 PM  lidocaine 1.5%-EPINEPHrine 1:200,000 (XYLOCAINE W/EPI) injection - Epidural   3 mL - 1/23/2024 10:19:00 PM  Number of Attempts: 1  Post Assessment:  Dressing:occlusive dressing applied and secured with tape  Pt Tolerance:patient tolerated the procedure well with no apparent complications  Complications:no

## 2024-01-24 NOTE — PLAN OF CARE
Problem: Adult Inpatient Plan of Care  Goal: Plan of Care Review  Outcome: Ongoing, Progressing  Goal: Patient-Specific Goal (Individualized)  Outcome: Ongoing, Progressing  Goal: Absence of Hospital-Acquired Illness or Injury  Outcome: Ongoing, Progressing  Intervention: Identify and Manage Fall Risk  Recent Flowsheet Documentation  Taken 1/24/2024 0315 by Alexandra Kaiser, RN  Safety Promotion/Fall Prevention:   assistive device/personal items within reach   clutter free environment maintained   nonskid shoes/slippers when out of bed   room organization consistent   safety round/check completed  Intervention: Prevent Skin Injury  Recent Flowsheet Documentation  Taken 1/24/2024 0315 by Alexandra Kaiser RN  Body Position: position changed independently  Intervention: Prevent and Manage VTE (Venous Thromboembolism) Risk  Recent Flowsheet Documentation  Taken 1/24/2024 0315 by Alexandra Kaiser RN  Activity Management: up ad bonita  Goal: Readiness for Transition of Care  Outcome: Ongoing, Progressing     Problem: Adjustment to Role Transition (Postpartum Vaginal Delivery)  Goal: Successful Maternal Role Transition  Outcome: Ongoing, Progressing     Problem: Bleeding (Postpartum Vaginal Delivery)  Goal: Hemostasis  Outcome: Ongoing, Progressing     Problem: Infection (Postpartum Vaginal Delivery)  Goal: Absence of Infection Signs/Symptoms  Outcome: Ongoing, Progressing  Intervention: Prevent or Manage Infection  Recent Flowsheet Documentation  Taken 1/24/2024 0345 by Alexandra Kaiser, RN  Perineal Care:   absorbent brief/pad changed   perineal hygiene encouraged   perineal spray bottle/warm water use encouraged   perineum cleansed     Problem: Pain (Postpartum Vaginal Delivery)  Goal: Acceptable Pain Control  Outcome: Ongoing, Progressing     Problem: Urinary Retention (Postpartum Vaginal Delivery)  Goal: Effective Urinary Elimination  Outcome: Ongoing, Progressing   Goal Outcome Evaluation:

## 2024-01-25 VITALS
TEMPERATURE: 97.7 F | OXYGEN SATURATION: 98 % | HEART RATE: 93 BPM | RESPIRATION RATE: 16 BRPM | WEIGHT: 150 LBS | HEIGHT: 63 IN | DIASTOLIC BLOOD PRESSURE: 74 MMHG | BODY MASS INDEX: 26.58 KG/M2 | SYSTOLIC BLOOD PRESSURE: 129 MMHG

## 2024-01-25 RX ORDER — IBUPROFEN 600 MG/1
600 TABLET ORAL EVERY 6 HOURS PRN
Qty: 20 TABLET | Refills: 0 | Status: SHIPPED | OUTPATIENT
Start: 2024-01-25

## 2024-01-25 RX ADMIN — DOCUSATE SODIUM 100 MG: 100 CAPSULE, LIQUID FILLED ORAL at 08:59

## 2024-01-25 RX ADMIN — FERROUS SULFATE TAB 325 MG (65 MG ELEMENTAL FE) 325 MG: 325 (65 FE) TAB at 08:59

## 2024-01-25 RX ADMIN — IBUPROFEN 600 MG: 600 TABLET, FILM COATED ORAL at 05:33

## 2024-01-25 RX ADMIN — IBUPROFEN 600 MG: 600 TABLET, FILM COATED ORAL at 11:37

## 2024-01-25 RX ADMIN — ACETAMINOPHEN 650 MG: 325 TABLET ORAL at 01:20

## 2024-01-25 RX ADMIN — VITAMIN A, VITAMIN C, VITAMIN D, VITAMIN E, THIAMINE, RIBOFLAVIN, NIACIN, VITAMIN B6, FOLIC ACID, VITAMIN B12, CALCIUM, IRON, ZINC, COPPER 1 TABLET: 4000; 120; 400; 22; 1.84; 3; 20; 10; 1; 12; 200; 27; 25; 2 TABLET ORAL at 08:59

## 2024-01-25 NOTE — PLAN OF CARE
Problem: Adult Inpatient Plan of Care  Goal: Plan of Care Review  Outcome: Ongoing, Progressing  Goal: Patient-Specific Goal (Individualized)  Outcome: Ongoing, Progressing  Goal: Absence of Hospital-Acquired Illness or Injury  Outcome: Ongoing, Progressing  Intervention: Identify and Manage Fall Risk  Recent Flowsheet Documentation  Taken 1/24/2024 2200 by Tia Salcido RN  Safety Promotion/Fall Prevention: safety round/check completed  Intervention: Prevent Skin Injury  Recent Flowsheet Documentation  Taken 1/24/2024 2200 by Tia Salcido RN  Body Position: position changed independently  Intervention: Prevent and Manage VTE (Venous Thromboembolism) Risk  Recent Flowsheet Documentation  Taken 1/24/2024 2200 by Tia Salcido RN  Activity Management: up ad bonita  Goal: Optimal Comfort and Wellbeing  Outcome: Ongoing, Progressing  Intervention: Monitor Pain and Promote Comfort  Recent Flowsheet Documentation  Taken 1/24/2024 2141 by Tia Salcido RN  Pain Management Interventions:   pillow support provided   see MAR   position adjusted  Intervention: Provide Person-Centered Care  Recent Flowsheet Documentation  Taken 1/24/2024 2200 by Tia Salcido RN  Trust Relationship/Rapport:   care explained   choices provided   thoughts/feelings acknowledged  Goal: Readiness for Transition of Care  Outcome: Ongoing, Progressing     Problem: Adjustment to Role Transition (Postpartum Vaginal Delivery)  Goal: Successful Maternal Role Transition  Outcome: Ongoing, Progressing     Problem: Bleeding (Postpartum Vaginal Delivery)  Goal: Hemostasis  Outcome: Ongoing, Progressing     Problem: Infection (Postpartum Vaginal Delivery)  Goal: Absence of Infection Signs/Symptoms  Outcome: Ongoing, Progressing     Problem: Pain (Postpartum Vaginal Delivery)  Goal: Acceptable Pain Control  Outcome: Ongoing, Progressing  Intervention: Prevent or Manage Pain  Recent Flowsheet Documentation  Taken 1/24/2024 2141 by Tia Salcido RN  Pain  Management Interventions:   pillow support provided   see MAR   position adjusted     Problem: Urinary Retention (Postpartum Vaginal Delivery)  Goal: Effective Urinary Elimination  Outcome: Ongoing, Progressing   Goal Outcome Evaluation:

## 2024-01-25 NOTE — PROGRESS NOTES
Postpartum Progress Note     PPD#1    Kenzie Marcos is a 33 y.o.  who is postpartum day #1. Patient is doing well and denies any complaints. Patient states pain is well controlled.  Patient states lochia is light. Patient denies any HA, vision changes, RUQ pain. Patient denies any postpartum depression or thoughts of harming herself or others. Patient is breastfeeding.      Vitals:    24 0502   BP: 106/51   Pulse: 77   Resp: 20   Temp: 97.7 °F (36.5 °C)   SpO2:          Physical Exam:  HEENT:Normocephalic and atraumatic, Thyroid WNL,   Lungs: CTA B/L, negative W/W/R   Abdomen: Soft, appropriate tenderness to palpation, Uterine fundus firm and below the umbilicus  Extremities: Negative swelling or cyanosis, DTRs WNL, negative     ASSESSMENT/PLAN:    Patient is a 33 y.o.female who is PPD# 1s/p    -Rh+/Rubella immune    -Encourage ambulation    -breastfeeding    -Patient denies any complaints, continue postpartum care    -Desires discharge home today if infant ok for discharge        Rochelle Rivera DO  2024 06:51 EST

## 2024-01-25 NOTE — LACTATION NOTE
LC in to see this p2 patient. She has been exclusively breastfeeding but infant's weight loss is concerning to Neonatologist. He is encouraging some supplementation. LC discussed ways to supplement to promote continued breastfeeding. She is to see a lactation consultant at her [facility] tomorrow. Patient is planning on discharge today. LC discussed normal infant output patterns to expect and unlimited time/access to the breast but if infant is not waking by 3 hours to wake and feed using measures shown in the hospital. LC discussed checking to make sure new medications are safe to breastfeed. LC discussed alcohol use and cigarette/second hand smoke around baby and breastfeeding and discussed the impact of street drugs on infants and breastfeeding. LC used the page in the breastfeeding guide to discuss harmful effects of these. Breastfeeding/Lactation expectations and anticipatory guidance discussed for the next two weeks . LC discussed nipple care, plugged ducts, engorgement, and breast infection. LC encouraged mom to see pediatrician two days from discharge for follow up. Patient has a breastpump for home use and LC discussed good pumping guidelines and normal expectations with pumping and storage and preparation of ebm for feedings. LC discussed breastfeeding/lactation resources including the local breastfeeding support group after discharge and when to call the doctor. Patient showed good understanding.

## 2024-01-25 NOTE — ANESTHESIA POSTPROCEDURE EVALUATION
Patient: Kenzie Marcos    Procedure Summary       Date: 01/23/24 Room / Location:     Anesthesia Start: 2216 Anesthesia Stop: 01/24/24 0019    Procedure: LABOR ANALGESIA Diagnosis:     Scheduled Providers:  Provider: Veena Pablo CRNA    Anesthesia Type: epidural ASA Status: 2            Anesthesia Type: epidural    Vitals  Vitals Value Taken Time   /51 01/25/24 0502   Temp 36.5 °C (97.7 °F) 01/25/24 0502   Pulse 77 01/25/24 0502   Resp 20 01/25/24 0502   SpO2 99 % 01/24/24 0043   Vitals shown include unfiled device data.        Post Anesthesia Care and Evaluation    Patient location during evaluation: bedside  Patient participation: complete - patient participated  Level of consciousness: awake  Pain score: 1  Pain management: adequate    Airway patency: patent  Anesthetic complications: No anesthetic complications  PONV Status: none  Cardiovascular status: acceptable  Respiratory status: acceptable  Hydration status: acceptable  Post Neuraxial Block status: Motor and sensory function returned to baseline and No signs or symptoms of PDPH  Comments: Patient had return of sensation and function.  Then, this morning she has some discomfort in her right leg and left arm and says she feels weak overall.  It seems to stem from positioning and normal post partum. RN's will continue to monitor her and let us know if there are significant changes.  No anesthesia care post op

## 2024-01-25 NOTE — DISCHARGE SUMMARY
OB Discharge Summary        Admit Date:  2024  Date of Delivery: 2024   Discharge Date: 2024      Reason for Admission:  latent labor, Cat 2 strip     Final Diagnosis:  Patient Active Problem List   Diagnosis    Mixed hyperlipidemia    Major depressive disorder, recurrent episode, moderate degree    History of gestational diabetes in prior pregnancy, currently pregnant    Supervision of other normal pregnancy, antepartum    Rubella non-immune status, antepartum    Diet controlled gestational diabetes mellitus (GDM)    Gestational thrombocytopenia without hemorrhage in third trimester    Encounter for induction of labor    Non-reassuring fetal heart tones complicating pregnancy, antepartum       To do at FU: 5-6 weeks pp    Antepartum:  Prenatal care is complicated by:  See above Final Diagnosis for list    Intrapartum/Delivery:  OB Surgeon: Dr. Miguel DO   Anesthesia: Epidural  Delivery Type:   Perineum : Intact  Feeding method: Breast    Infant: female  infant; viable    Weight: 3280 g (7 lb 3.7 oz)      APGARS: 8  @ 1 minute / 9  @ 5 minutes    Hospital Course/Significant Findings:  Patient is a 33 y.o.  at 38w6d who presents for spontaneous labor. She had an uncomplicated  and postpartum stay.     On the day of discharge POSTPARTUM pt tolerating a regular diet, ambulating, pain well controlled, urinating spontaneously and lochia appropriate.   Vital signs were stable and afebrile.  Exam was within normal limits.  Fundus was below umbilicus and nontender.  Meeting discharge criteria and desired discharge home.  Postpartum instructions and FU reviewed and questions answered.    Recent Results (from the past 72 hour(s))   Urine Drug Screen - Urine, Clean Catch    Collection Time: 24  5:19 PM    Specimen: Urine, Clean Catch   Result Value Ref Range    Amphet/Methamphet, Screen Negative Negative    Barbiturates Screen, Urine Negative Negative    Benzodiazepine Screen, Urine Negative  Negative    Cocaine Screen, Urine Negative Negative    Opiate Screen Negative Negative    THC, Screen, Urine Negative Negative    Methadone Screen, Urine Negative Negative    Oxycodone Screen, Urine Negative Negative    Fentanyl, Urine Negative Negative   POC Glucose Once    Collection Time: 01/23/24  5:28 PM    Specimen: Blood   Result Value Ref Range    Glucose 71 70 - 99 mg/dL   CBC (No Diff)    Collection Time: 01/23/24  5:40 PM    Specimen: Blood   Result Value Ref Range    WBC 7.42 3.40 - 10.80 10*3/mm3    RBC 4.03 3.77 - 5.28 10*6/mm3    Hemoglobin 12.8 12.0 - 15.9 g/dL    Hematocrit 36.8 34.0 - 46.6 %    MCV 91.3 79.0 - 97.0 fL    MCH 31.8 26.6 - 33.0 pg    MCHC 34.8 31.5 - 35.7 g/dL    RDW 13.6 12.3 - 15.4 %    RDW-SD 46.1 37.0 - 54.0 fl    MPV 10.5 6.0 - 12.0 fL    Platelets 164 140 - 450 10*3/mm3   T Pallidum Antibody w/ reflex RPR    Collection Time: 01/23/24  5:40 PM    Specimen: Blood   Result Value Ref Range    Treponemal AB Total Non-Reactive Non-Reactive   Type & Screen    Collection Time: 01/23/24  5:40 PM    Specimen: Blood   Result Value Ref Range    ABO Type O     RH type Positive     Antibody Screen Negative     T&S Expiration Date 1/26/2024 11:59:59 PM    POC Glucose Once    Collection Time: 01/23/24  9:05 PM    Specimen: Blood   Result Value Ref Range    Glucose 100 (H) 70 - 99 mg/dL   ]    Physical Exam:  HEENT:Normocephalic and atraumatic, Thyroid WNL,   Lungs: CTA B/L, negative W/W/R   Abdomen: Soft, appropriate tenderness to palpation, Uterine fundus firm and below the umbilicus  Extremities: Negative swelling or cyanosis, DTRs WNL, negative clonus    Vitals:    01/25/24 0502   BP: 106/51   Pulse: 77   Resp: 20   Temp: 97.7 °F (36.5 °C)   SpO2:        Discharge:         Discharge Medications        New Medications        Instructions Start Date   ibuprofen 600 MG tablet  Commonly known as: ADVIL,MOTRIN   600 mg, Oral, Every 6 Hours PRN             Continue These Medications         Instructions Start Date   Curity Alcohol Preps 70 % pads   Four times daily blood sugar testing, fasting and two hours after each meal      ferrous sulfate 325 (65 FE) MG tablet   325 mg, Oral, Daily With Breakfast      freestyle lancets   Four times daily blood sugar testing, fasting and two hours after each meal. Patient has the one touch glucometer.      prenatal vitamin tablet   Oral, Daily             Stop These Medications      glucose blood test strip                Disposition: Home  Diet: Regular    Pelvic Rest: 6 weeks    Condition at discharge: Good    Follow up with: Rochelle Rivera DO or provider of her choice    Follow up in: 5 weeks      Complications: None      Electronically signed by:    Rochelle Rivera DO  01/25/24  07:16 EST          [facility]  [address]  [place] [address]  Dept: [phone]  Loc: [phone]

## 2024-01-30 ENCOUNTER — TELEPHONE (OUTPATIENT)
Dept: LACTATION | Facility: HOSPITAL | Age: 34
End: 2024-01-30
Payer: COMMERCIAL

## 2024-01-30 NOTE — TELEPHONE ENCOUNTER
LC called to check on breastfeeding progress. Patient states because of some concerns she has changed to exclusive pumping and baby is doing well with this. She is being followed by the lactation consultant at her pedi office.

## 2024-02-01 ENCOUNTER — MATERNAL SCREENING (OUTPATIENT)
Dept: CALL CENTER | Facility: HOSPITAL | Age: 34
End: 2024-02-01
Payer: COMMERCIAL

## 2024-02-01 NOTE — OUTREACH NOTE
Maternal Screening Survey      Flowsheet Row Responses   Facility patient discharged from? [facility]   Attempt successful? Yes   Call end time 1605   EPD Scale: Able to Laugh 0-->as much as she always could   EPD Scale: Looked Forward 0-->as much as she ever did   EPD Scale: Blamed Self 0-->no, never   EPD Scale: Been Anxious 0-->no, not at all   EPD Scale: Felt Panicky 0-->no, not at all   EPD Scale: Things Getting on Top 0-->no, has been coping as well as ever   EPD Scale: Difficulty Sleeping 0-->no, not at all   EPD Scale: Sad or Miserable 0-->no, not at all   EPD Scale: Crying 0-->no, never   EPD Scale: Thought of Harming Self 0-->never   [place]  Depression Score 0   Did any of your parents have problems with alcohol or drug use? No   Do any of your peers have problems with alcohol or drug use? No   Does your partner have problems with alcohol or drug use? No   Before you were pregnant did you have problems with alcohol or drug use? (past) No   In the past month, did you drink beer, wine, liquor or use any other drugs? (pregnancy) No   Maternal Screening call completed Yes   Call end time 1605              Jessica TOLENTINO - Registered Nurse

## 2024-03-07 ENCOUNTER — PREP FOR SURGERY (OUTPATIENT)
Dept: OTHER | Facility: HOSPITAL | Age: 34
End: 2024-03-07
Payer: COMMERCIAL

## 2024-03-07 ENCOUNTER — POSTPARTUM VISIT (OUTPATIENT)
Dept: OBSTETRICS AND GYNECOLOGY | Facility: CLINIC | Age: 34
End: 2024-03-07
Payer: COMMERCIAL

## 2024-03-07 VITALS
WEIGHT: 131.2 LBS | DIASTOLIC BLOOD PRESSURE: 89 MMHG | HEIGHT: 63 IN | BODY MASS INDEX: 23.25 KG/M2 | SYSTOLIC BLOOD PRESSURE: 129 MMHG

## 2024-03-07 DIAGNOSIS — Z30.2 ENCOUNTER FOR STERILIZATION: Primary | ICD-10-CM

## 2024-03-07 RX ORDER — ACETAMINOPHEN 500 MG
1000 TABLET ORAL ONCE
OUTPATIENT
Start: 2024-03-07 | End: 2024-03-07

## 2024-03-07 RX ORDER — SODIUM CHLORIDE 0.9 % (FLUSH) 0.9 %
3 SYRINGE (ML) INJECTION EVERY 12 HOURS SCHEDULED
OUTPATIENT
Start: 2024-03-07

## 2024-03-07 RX ORDER — SODIUM CHLORIDE 9 MG/ML
40 INJECTION, SOLUTION INTRAVENOUS AS NEEDED
OUTPATIENT
Start: 2024-03-07

## 2024-03-07 RX ORDER — SODIUM CHLORIDE 0.9 % (FLUSH) 0.9 %
10 SYRINGE (ML) INJECTION AS NEEDED
OUTPATIENT
Start: 2024-03-07

## 2024-03-07 NOTE — PROGRESS NOTES
"  POSTPARTUM Follow Up Visit      Chief Complaint   Patient presents with    Postpartum Care     6 week pp     HPI:      Date of delivery: 24  Delivery type:            Perineum : Intact  Delivering Provider:   Dr. Rochelle Rivera      Feeding: Bottle  Pain:  No  Vaginal Bleeding:  Yes  Depressed/Anxious:  No  EPDS score: 3   #10: 0  Plans for BC:  Sterilization/tubal  Weight at last OB OV:  132lb  Last pap date and result: 23-normal       Postpartum inventory   Infant feeding: Bottle  Postpartum pain: None  Vaginal bleeding : Mostly light - only using liner  Depression/Anxiety: Minimal depression  Contracepton: Sterilization/tubal    EPD Scale: Thought of Harming Self: 0-->never  [place]  Depression Score: 3        PHYSICAL EXAM:  /89   Ht 160 cm (63\")   Wt 59.5 kg (131 lb 3.2 oz)   LMP 2024 (Exact Date)   Breastfeeding No   BMI 23.24 kg/m²  9.979 kg (22 lb)  General- NAD, alert and oriented, appropriate  Psych- Normal mood, good memory, good eye contact  Abdomen- Soft, non distended, non tender, no masses  External genitalia- Normal.    Urethra/Bladder/Vagina- Normal, no masses, non-tender, no prolapse   Cvx- Normal, no lesions, no discharge, no CMT  Uterus- Normal size, shape & consistency.  Non tender, mobile.  Adnexa- Normal, no mass, non-tender    Lymphatic- No palpable groin nodes  Ext- No edema    ASSESSMENT AND PLAN:  Diagnoses and all orders for this visit:    1. Postpartum follow-up (Primary)      Counseling:    All birth control options reviewed in detail.  R/B/A/SE/E of each wrt pts PMHx and prior BC use      Follow Up:  Return in about 2 weeks (around 3/21/2024) for Post op visit.    I spent 20 minutes on the separately reported service of Postpartum. This time is not included in the time used to support the E/M service also reported today.    Rochelle Rivera DO  2024    [facility]  [address] [place] " [place] [address]  Dept: [phone]  Dept Fax: [phone]  Loc: [phone]  Loc Fax: [phone]

## 2024-03-15 PROBLEM — Z30.2 ENCOUNTER FOR STERILIZATION: Status: ACTIVE | Noted: 2024-03-07

## 2024-03-29 NOTE — PRE-PROCEDURE INSTRUCTIONS
IMPORTANT INSTRUCTIONS - PRE-ADMISSION TESTING  DO NOT EAT OR CHEW anything after midnight the night before your procedure.    You may have CLEAR liquids up to _2__ hours prior to ARRIVAL time.   Take the following medications the morning of your procedure with JUST A SIP OF WATER:  NO MEDICATIONS _______________________________________________________________________________________________________________________________________________________________________________________    DO NOT BRING your medications to the hospital with you, UNLESS something has changed since your PRE-Admission Testing appointment.  Hold all vitamins, supplements, and NSAIDS (Non- steroidal anti-inflammatory meds) for one week prior to surgery (you MAY take Tylenol or Acetaminophen).  If you are diabetic, check your blood sugar the morning of your procedure. If it is less than 70 or if you are feeling symptomatic, call the following number for further instructions: [phone]_.  Use your inhalers/nebulizers as usual, the morning of your procedure. BRING YOUR INHALERS with you.   Bring your CPAP or BIPAP to hospital, ONLY IF YOU WILL BE SPENDING THE NIGHT.   Make sure you have a ride home and have someone who will stay with you the day of your procedure after you go home.  If you have any questions, please call your Pre-Admission Testing Nurse, VIDYA__ at [phone]_____.   Per anesthesia request, do not smoke for 24 hours before your procedure or as instructed by your surgeon.    Clear Liquid Diet        Find out when you need to start a clear liquid diet.   Think of “clear liquids” as anything you could read a newspaper through. This includes things like water, broth, sports drinks, or tea WITHOUT any kind of milk or cream.           Once you are told to start a clear liquid diet, only drink these things until 2 hours before arrival to the hospital or when the hospital says to stop. Total volume limitation: 8 oz.       Clear  liquids you CAN drink:   Water   Clear broth: beef, chicken, vegetable, or bone broth with nothing in it   Gatorade   Lemonade or [place]-aid   Soda   Tea, coffee (NO cream or honey)   Jell-O (without fruit)   Popsicles (without fruit or cream)   Italian ices   Juice without pulp: apple, white, grape   You may use salt, pepper, and sugar  NO RED LIQUIDS     Do NOT drink:   Milk or cream   Soy milk, almond milk, coconut milk, or other non-dairy drinks and   creamers   Milkshakes or smoothies   Tomato juice   Orange juice   Grapefruit juice   Cream soups or any other than broth         Clear Liquid Diet:  Do NOT eat any solid food.  Do NOT eat or suck on mints or candy.  Do NOT chew gum.  Do NOT drink thick liquids like milk or juice with pulp in it.  Do NOT add milk, cream, or anything like soy milk or almond milk to coffee or tea.       PREOPERATIVE (BEFORE SURGERY)              BATHING INSTRUCTIONS  Instructions:    You will need to shower 1 times utilizing the soap provided; at the times indicated   below:     MORNING OF SURGERY      Wash your hair and face with normal shampoo and soap, rinse it well before using the surgical soap.      In the shower, wet the skin completely with water from your neck to your feet. Apply the cleanser to your   body ONLY FROM THE NECK TO YOUR FEET.     Do NOT USE THE CLEANSER ON YOUR FACE, HEAD, OR GENITAL (PRIVATE) AREAS.   Keep it out of your eyes, ears, and mouth because of the risk of injury to those areas.      Scrub with a clean washcloth for each bath utilizing the soap provided from the top of your body to the   bottom starting at the neck area.      Pay close attention to your armpits, groin area, and the site of surgery.      Wash your body gently for 5 minutes. Stand outside the stream or turn off the water while scrubbing your   body. Do NOT wash with your regular soap after the surgical cleanser is used.      RINSE THE CLEANSER OFF COMPLETELY with plenty of water. Rinse  the area again thoroughly.      Dry off with a clean towel. The surgical soap can cause dryness; however do NOT APPLY LOTION,   CREAM, POWDER, and/or DEODORANT AFTER SHOWERING.     Be sure to where clean clothes after showering.      Ensure CLEAN BED LINENS AFTER FIRST wash with the surgical soap.      NO PETS ALLOWED IN THE BED with you after utilizing the surgical soap.

## 2024-04-04 ENCOUNTER — ANESTHESIA EVENT (OUTPATIENT)
Dept: PERIOP | Facility: HOSPITAL | Age: 34
End: 2024-04-04
Payer: COMMERCIAL

## 2024-04-04 NOTE — H&P
GYN HISTORY & PHYSICAL      Patient Name: Kenzie Marcos  : 1990  MRN: [id number]    Subjective     Chief Complaint: Sterilization      HPI:  33 y.o.  Patient's last menstrual period was 2024 (exact date).. Patient here today to undergo Operative laparoscopy with bilateral salpingectomy.   She wishes to proceed with the above procedure. She denies any F/C, D/C, N/V, CP or SOB.     Risks and benefits and alternatives of surgery were discussed with patient.  Risks are not limited to anesthesia, bleeding, blood transfusion, infection, damage to surrounding organs, wound separation, re-operation, thromboembolic disease, death.            ROS: Review of Systems   HENT: Negative.     Eyes: Negative.    Respiratory: Negative.     Cardiovascular: Negative.    Gastrointestinal: Negative.    Endocrine: Negative.    Musculoskeletal: Negative.    Allergic/Immunologic: Negative.    Neurological: Negative.    Hematological: Negative.    Psychiatric/Behavioral: Negative.           Personal History     PMHx:    Past Medical History:   Diagnosis Date    Abnormal Pap smear of cervix     Anemia     Anxiety     Chlamydia     Depression     Gestational diabetes     HPV (human papilloma virus) infection     Urogenital trichomoniasis     Vitamin D deficiency        OBHx:   OB History    Para Term  AB Living   2 2 2     2   SAB IAB Ectopic Molar Multiple Live Births           0 2      # Outcome Date GA Lbr Cristian/2nd Weight Sex Type Anes PTL Lv   2 Term 24 38w6d 05:34 / 00:29 3280 g (7 lb 3.7 oz) F Vag-Spont EPI N [place]   1 Term 21 39w4d  2863 g (6 lb 5 oz) M Vag-Spont   [place]        Home Medications:  prenatal vitamin    Allergies:  No Known Allergies    PSHx:   Past Surgical History:   Procedure Laterality Date    MOUTH SURGERY      WISDOM TOOTH EXTRACTION         Social History:    reports that she has never smoked. She has been exposed to tobacco smoke. She has never used smokeless tobacco. She  reports that she does not currently use alcohol after a past usage of about 4.0 - 5.0 standard drinks of alcohol per week. She reports that she does not use drugs.      Objective     PHYSICAL EXAM:   General- NAD, alert and oriented, appropriate  Psych- Normal mood, good memory  CV- Regular rhythm, no murnurs  Resp- CTA to bases, no wheezes  Abdomen- NABS, soft, non distended, non tender, no masses  Pelvic- Defer to OR         Assessment / Plan     Assessment:  Sterilization     Plan:   Operative laparoscopy with bilateral salpingectomy  Patient to follow up in office in 2 weeks for post op visit. All questions and concerns have been answered.       Counseling: Risks and benefits and alternatives of surgery were discussed with patient.  Risks are not limited to anesthesia, bleeding, blood transfusion, infection, damage to surrounding organs, wound separation, re-operation, thromboembolic disease, death.  See separate written and signed consent for surgical specific risks and benefits.        Signature:   Electronically signed by:    Rochelle Rivera DO  04/05/24  08:12 EDT

## 2024-04-05 ENCOUNTER — ANESTHESIA (OUTPATIENT)
Dept: PERIOP | Facility: HOSPITAL | Age: 34
End: 2024-04-05
Payer: COMMERCIAL

## 2024-04-05 ENCOUNTER — HOSPITAL ENCOUNTER (OUTPATIENT)
Facility: HOSPITAL | Age: 34
Setting detail: HOSPITAL OUTPATIENT SURGERY
Discharge: HOME | End: 2024-04-05
Attending: OBSTETRICS & GYNECOLOGY | Admitting: OBSTETRICS & GYNECOLOGY
Payer: COMMERCIAL

## 2024-04-05 VITALS
RESPIRATION RATE: 16 BRPM | WEIGHT: 131.84 LBS | OXYGEN SATURATION: 99 % | HEART RATE: 71 BPM | SYSTOLIC BLOOD PRESSURE: 106 MMHG | BODY MASS INDEX: 23.36 KG/M2 | HEIGHT: 63 IN | TEMPERATURE: 98.4 F | DIASTOLIC BLOOD PRESSURE: 61 MMHG

## 2024-04-05 DIAGNOSIS — O36.8390 NON-REASSURING FETAL HEART TONES COMPLICATING PREGNANCY, ANTEPARTUM: Primary | ICD-10-CM

## 2024-04-05 DIAGNOSIS — Z30.2 ENCOUNTER FOR STERILIZATION: ICD-10-CM

## 2024-04-05 DIAGNOSIS — Z90.79 STATUS POST BILATERAL SALPINGECTOMY: ICD-10-CM

## 2024-04-05 LAB
ABO GROUP BLD: NORMAL
B-HCG UR QL: NEGATIVE
BLD GP AB SCN SERPL QL: NEGATIVE
RH BLD: POSITIVE
T&S EXPIRATION DATE: NORMAL

## 2024-04-05 PROCEDURE — 25010000002 SUGAMMADEX 200 MG/2ML SOLUTION: Performed by: NURSE ANESTHETIST, CERTIFIED REGISTERED

## 2024-04-05 PROCEDURE — 25810000003 LACTATED RINGERS PER 1000 ML: Performed by: ANESTHESIOLOGY

## 2024-04-05 PROCEDURE — 86901 BLOOD TYPING SEROLOGIC RH(D): CPT | Performed by: OBSTETRICS & GYNECOLOGY

## 2024-04-05 PROCEDURE — 86900 BLOOD TYPING SEROLOGIC ABO: CPT | Performed by: OBSTETRICS & GYNECOLOGY

## 2024-04-05 PROCEDURE — 25010000002 KETOROLAC TROMETHAMINE PER 15 MG: Performed by: NURSE ANESTHETIST, CERTIFIED REGISTERED

## 2024-04-05 PROCEDURE — 25010000002 MEPERIDINE PER 100 MG: Performed by: NURSE ANESTHETIST, CERTIFIED REGISTERED

## 2024-04-05 PROCEDURE — 25010000002 DEXAMETHASONE PER 1 MG: Performed by: NURSE ANESTHETIST, CERTIFIED REGISTERED

## 2024-04-05 PROCEDURE — 25010000002 ONDANSETRON PER 1 MG: Performed by: NURSE ANESTHETIST, CERTIFIED REGISTERED

## 2024-04-05 PROCEDURE — 25010000002 MIDAZOLAM PER 1MG: Performed by: ANESTHESIOLOGY

## 2024-04-05 PROCEDURE — 86850 RBC ANTIBODY SCREEN: CPT | Performed by: OBSTETRICS & GYNECOLOGY

## 2024-04-05 PROCEDURE — 58661 LAPAROSCOPY REMOVE ADNEXA: CPT | Performed by: OBSTETRICS & GYNECOLOGY

## 2024-04-05 PROCEDURE — 25010000002 PROPOFOL 10 MG/ML EMULSION: Performed by: NURSE ANESTHETIST, CERTIFIED REGISTERED

## 2024-04-05 PROCEDURE — 88302 TISSUE EXAM BY PATHOLOGIST: CPT | Performed by: OBSTETRICS & GYNECOLOGY

## 2024-04-05 PROCEDURE — 81025 URINE PREGNANCY TEST: CPT | Performed by: OBSTETRICS & GYNECOLOGY

## 2024-04-05 PROCEDURE — 25010000002 FENTANYL CITRATE (PF) 50 MCG/ML SOLUTION: Performed by: NURSE ANESTHETIST, CERTIFIED REGISTERED

## 2024-04-05 RX ORDER — ONDANSETRON 2 MG/ML
INJECTION INTRAMUSCULAR; INTRAVENOUS AS NEEDED
Status: DISCONTINUED | OUTPATIENT
Start: 2024-04-05 | End: 2024-04-05 | Stop reason: SURG

## 2024-04-05 RX ORDER — DEXMEDETOMIDINE HYDROCHLORIDE 100 UG/ML
INJECTION, SOLUTION INTRAVENOUS AS NEEDED
Status: DISCONTINUED | OUTPATIENT
Start: 2024-04-05 | End: 2024-04-05 | Stop reason: SURG

## 2024-04-05 RX ORDER — ONDANSETRON 2 MG/ML
4 INJECTION INTRAMUSCULAR; INTRAVENOUS ONCE AS NEEDED
Status: DISCONTINUED | OUTPATIENT
Start: 2024-04-05 | End: 2024-04-05 | Stop reason: HOSPADM

## 2024-04-05 RX ORDER — LIDOCAINE HYDROCHLORIDE AND EPINEPHRINE 10; 10 MG/ML; UG/ML
INJECTION, SOLUTION INFILTRATION; PERINEURAL AS NEEDED
Status: DISCONTINUED | OUTPATIENT
Start: 2024-04-05 | End: 2024-04-05 | Stop reason: HOSPADM

## 2024-04-05 RX ORDER — MIDAZOLAM HYDROCHLORIDE 2 MG/2ML
2 INJECTION, SOLUTION INTRAMUSCULAR; INTRAVENOUS ONCE
Status: COMPLETED | OUTPATIENT
Start: 2024-04-05 | End: 2024-04-05

## 2024-04-05 RX ORDER — MEPERIDINE HYDROCHLORIDE 25 MG/ML
12.5 INJECTION INTRAMUSCULAR; INTRAVENOUS; SUBCUTANEOUS
Status: DISCONTINUED | OUTPATIENT
Start: 2024-04-05 | End: 2024-04-05 | Stop reason: HOSPADM

## 2024-04-05 RX ORDER — SODIUM CHLORIDE 0.9 % (FLUSH) 0.9 %
10 SYRINGE (ML) INJECTION AS NEEDED
Status: DISCONTINUED | OUTPATIENT
Start: 2024-04-05 | End: 2024-04-05 | Stop reason: HOSPADM

## 2024-04-05 RX ORDER — ACETAMINOPHEN 500 MG
1000 TABLET ORAL ONCE
Status: COMPLETED | OUTPATIENT
Start: 2024-04-05 | End: 2024-04-05

## 2024-04-05 RX ORDER — PROMETHAZINE HYDROCHLORIDE 12.5 MG/1
25 TABLET ORAL ONCE AS NEEDED
Status: DISCONTINUED | OUTPATIENT
Start: 2024-04-05 | End: 2024-04-05 | Stop reason: HOSPADM

## 2024-04-05 RX ORDER — ACETAMINOPHEN 500 MG
1000 TABLET ORAL ONCE
Status: DISCONTINUED | OUTPATIENT
Start: 2024-04-05 | End: 2024-04-05 | Stop reason: HOSPADM

## 2024-04-05 RX ORDER — PROMETHAZINE HYDROCHLORIDE 25 MG/1
25 SUPPOSITORY RECTAL ONCE AS NEEDED
Status: DISCONTINUED | OUTPATIENT
Start: 2024-04-05 | End: 2024-04-05 | Stop reason: HOSPADM

## 2024-04-05 RX ORDER — FENTANYL CITRATE 50 UG/ML
INJECTION, SOLUTION INTRAMUSCULAR; INTRAVENOUS AS NEEDED
Status: DISCONTINUED | OUTPATIENT
Start: 2024-04-05 | End: 2024-04-05 | Stop reason: SURG

## 2024-04-05 RX ORDER — SODIUM CHLORIDE 9 MG/ML
40 INJECTION, SOLUTION INTRAVENOUS AS NEEDED
Status: DISCONTINUED | OUTPATIENT
Start: 2024-04-05 | End: 2024-04-05 | Stop reason: HOSPADM

## 2024-04-05 RX ORDER — SODIUM CHLORIDE, SODIUM LACTATE, POTASSIUM CHLORIDE, CALCIUM CHLORIDE 600; 310; 30; 20 MG/100ML; MG/100ML; MG/100ML; MG/100ML
9 INJECTION, SOLUTION INTRAVENOUS CONTINUOUS PRN
Status: DISCONTINUED | OUTPATIENT
Start: 2024-04-05 | End: 2024-04-05 | Stop reason: HOSPADM

## 2024-04-05 RX ORDER — SODIUM CHLORIDE 0.9 % (FLUSH) 0.9 %
3 SYRINGE (ML) INJECTION EVERY 12 HOURS SCHEDULED
Status: DISCONTINUED | OUTPATIENT
Start: 2024-04-05 | End: 2024-04-05 | Stop reason: HOSPADM

## 2024-04-05 RX ORDER — LIDOCAINE HYDROCHLORIDE 20 MG/ML
INJECTION, SOLUTION EPIDURAL; INFILTRATION; INTRACAUDAL; PERINEURAL AS NEEDED
Status: DISCONTINUED | OUTPATIENT
Start: 2024-04-05 | End: 2024-04-05 | Stop reason: SURG

## 2024-04-05 RX ORDER — KETOROLAC TROMETHAMINE 15 MG/ML
INJECTION, SOLUTION INTRAMUSCULAR; INTRAVENOUS AS NEEDED
Status: DISCONTINUED | OUTPATIENT
Start: 2024-04-05 | End: 2024-04-05 | Stop reason: SURG

## 2024-04-05 RX ORDER — HYDROCODONE BITARTRATE AND ACETAMINOPHEN 5; 325 MG/1; MG/1
1 TABLET ORAL EVERY 6 HOURS PRN
Qty: 5 TABLET | Refills: 0 | Status: SHIPPED | OUTPATIENT
Start: 2024-04-05 | End: 2024-04-18

## 2024-04-05 RX ORDER — PROPOFOL 10 MG/ML
VIAL (ML) INTRAVENOUS AS NEEDED
Status: DISCONTINUED | OUTPATIENT
Start: 2024-04-05 | End: 2024-04-05 | Stop reason: SURG

## 2024-04-05 RX ORDER — IBUPROFEN 800 MG/1
800 TABLET ORAL EVERY 6 HOURS PRN
Qty: 20 TABLET | Refills: 0 | Status: SHIPPED | OUTPATIENT
Start: 2024-04-05 | End: 2024-04-18

## 2024-04-05 RX ORDER — DEXAMETHASONE SODIUM PHOSPHATE 4 MG/ML
INJECTION, SOLUTION INTRA-ARTICULAR; INTRALESIONAL; INTRAMUSCULAR; INTRAVENOUS; SOFT TISSUE AS NEEDED
Status: DISCONTINUED | OUTPATIENT
Start: 2024-04-05 | End: 2024-04-05 | Stop reason: SURG

## 2024-04-05 RX ORDER — ROCURONIUM BROMIDE 10 MG/ML
INJECTION, SOLUTION INTRAVENOUS AS NEEDED
Status: DISCONTINUED | OUTPATIENT
Start: 2024-04-05 | End: 2024-04-05 | Stop reason: SURG

## 2024-04-05 RX ORDER — OXYCODONE HYDROCHLORIDE 5 MG/1
5 TABLET ORAL
Status: DISCONTINUED | OUTPATIENT
Start: 2024-04-05 | End: 2024-04-05 | Stop reason: HOSPADM

## 2024-04-05 RX ADMIN — DEXMEDETOMIDINE 5 MCG: 100 INJECTION, SOLUTION INTRAVENOUS at 09:37

## 2024-04-05 RX ADMIN — PROPOFOL 140 MG: 10 INJECTION, EMULSION INTRAVENOUS at 09:26

## 2024-04-05 RX ADMIN — MEPERIDINE HYDROCHLORIDE 12.5 MG: 25 INJECTION INTRAMUSCULAR; INTRAVENOUS; SUBCUTANEOUS at 10:55

## 2024-04-05 RX ADMIN — ROCURONIUM BROMIDE 35 MG: 10 INJECTION, SOLUTION INTRAVENOUS at 09:26

## 2024-04-05 RX ADMIN — SUGAMMADEX 120 MG: 100 INJECTION, SOLUTION INTRAVENOUS at 10:04

## 2024-04-05 RX ADMIN — ACETAMINOPHEN 1000 MG: 500 TABLET ORAL at 08:27

## 2024-04-05 RX ADMIN — OXYCODONE 5 MG: 5 TABLET ORAL at 10:55

## 2024-04-05 RX ADMIN — DEXAMETHASONE SODIUM PHOSPHATE 4 MG: 4 INJECTION, SOLUTION INTRAMUSCULAR; INTRAVENOUS at 09:26

## 2024-04-05 RX ADMIN — LIDOCAINE HYDROCHLORIDE 60 MG: 20 INJECTION, SOLUTION INTRAVENOUS at 09:26

## 2024-04-05 RX ADMIN — SODIUM CHLORIDE, POTASSIUM CHLORIDE, SODIUM LACTATE AND CALCIUM CHLORIDE 9 ML/HR: 600; 310; 30; 20 INJECTION, SOLUTION INTRAVENOUS at 09:17

## 2024-04-05 RX ADMIN — FENTANYL CITRATE 50 MCG: 50 INJECTION, SOLUTION INTRAMUSCULAR; INTRAVENOUS at 09:24

## 2024-04-05 RX ADMIN — DEXMEDETOMIDINE 5 MCG: 100 INJECTION, SOLUTION INTRAVENOUS at 09:41

## 2024-04-05 RX ADMIN — DEXMEDETOMIDINE 5 MCG: 100 INJECTION, SOLUTION INTRAVENOUS at 09:31

## 2024-04-05 RX ADMIN — ONDANSETRON HYDROCHLORIDE 4 MG: 2 SOLUTION INTRAMUSCULAR; INTRAVENOUS at 09:54

## 2024-04-05 RX ADMIN — FENTANYL CITRATE 50 MCG: 50 INJECTION, SOLUTION INTRAMUSCULAR; INTRAVENOUS at 09:26

## 2024-04-05 RX ADMIN — MIDAZOLAM HYDROCHLORIDE 2 MG: 1 INJECTION, SOLUTION INTRAMUSCULAR; INTRAVENOUS at 09:17

## 2024-04-05 RX ADMIN — KETOROLAC TROMETHAMINE 30 MG: 15 INJECTION, SOLUTION INTRAMUSCULAR; INTRAVENOUS at 10:01

## 2024-04-05 NOTE — DISCHARGE INSTRUCTIONS
DISCHARGE INSTRUCTIONS  GYNECOLOGICAL  PROCEDURES      For your surgery you had:  General anesthesia (you may have a sore throat for the first 24 hours)  IV sedation  Local anesthesia  Monitored anesthesia care  You received a medicated patch for nausea prevention today (behind your ear). It is recommended that you remove it 24-48 hours post-operatively. It must be removed within 72 hours.  You received an anesthesia medication today that can cause hormonal forms of birth control to be ineffective. You should use a different form of birth control (to prevent pregnancy) for 7 days.   You may experience dizziness, drowsiness, or lightheadedness for several hours following surgery.  Do not stay alone today or tonight.  Limit your activity for 24 hours.  Resume your diet slowly.  Follow any special dietary instructions you may have been given by your doctor.  You should not drive or operate machinery, drink alcohol, or sign legally binding documents for 24 hours or while you are taking pain medication.  Last dose of pain medication was given at:  .  NOTIFY YOUR DOCTOR IF YOU EXPERIENCE ANY OF THE FOLLOWING:  Temperature greater than 101 degrees Fahrenheit  Shaking Chills  Redness or excessive drainage from incision  Nausea, vomiting and/or pain that is not controlled by prescribed medications  Increase in bleeding or bleeding that is excessive  Unable to urinate in 6 hours after surgery  If unable to reach your doctor, please go to the closest Emergency Room [] Remove dressing in:     [] You may resume intercourse and the use of tampons as your physician has instructed you.  [] Nothing in the vagina for 2 weeks to include intercourse, douches, or tampons.  [] Vaginal bleeding may be expected for several days with flow decreasing with time and never any heavier than a normal   period.  If you have an ablation, vaginal discharge is expected after bleeding stops.   If you have foul smelling discharge, notify your  physician.  Medications per physician instructions as indicated on Discharge Medication Information Sheet.  You should see   for follow-up care   on   .  Phone number:      SPECIAL INSTRUCTIONS:

## 2024-04-05 NOTE — ANESTHESIA PREPROCEDURE EVALUATION
Anesthesia Evaluation     Patient summary reviewed and Nursing notes reviewed   no history of anesthetic complications:   NPO Solid Status: > 8 hours  NPO Liquid Status: > 2 hours           Airway   Mallampati: I  TM distance: >3 FB  Neck ROM: full  No difficulty expected  Dental      Pulmonary - negative pulmonary ROS and normal exam    breath sounds clear to auscultation  Cardiovascular - negative cardio ROS and normal exam  Exercise tolerance: good (4-7 METS)    Rhythm: regular  Rate: normal        Neuro/Psych  (+) psychiatric history Anxiety  GI/Hepatic/Renal/Endo - negative ROS     Musculoskeletal (-) negative ROS    Abdominal    Substance History - negative use     OB/GYN negative ob/gyn ROS         Other - negative ROS       ROS/Med Hx Other: PAT Nursing Notes unavailable.     Delivered 1/23/24                    Anesthesia Plan    ASA 1     general     (Patient understands anesthesia not responsible for dental damage.)  intravenous induction     Anesthetic plan, risks, benefits, and alternatives have been provided, discussed and informed consent has been obtained with: patient.    Use of blood products discussed with patient .    Plan discussed with CRNA.        CODE STATUS:

## 2024-04-05 NOTE — OP NOTE
PRE OP DIAGNOSIS:  Sterilization     POST OP DIAGNOSIS:  Same     PROCEDURE: Operative Laparoscopy with Bilateral Salpingectomy     SURGEON:  Rochelle Rivera DO    ASSISTANT: Assistant: Yanni Camacho RN CSA was responsible for performing the following activities: Retraction and Held/Positioned Camera and their skilled assistance was necessary for the success of this case.      ANESTHESIA PROVIDER:  Frantz Henry MD    ANESTHESIA TYPE:  General    ESTIMATED BLOOD LOSS:  10ml     COMPLICATIONS:  None    DISPOSITION:  To recovery room in stable condition    FINDINGS:  normal uterus, bilateral fallopian tubes and bilateral ovaries     SPECIMEN: Bilateral fallopian tubes    PROCEDURE:    The patient was taken to the operating room where general anesthesia was placed without difficulty.  She was prepped and draped in a normal sterile fashion and placed in low lithotomy position. A straight catheter was used to drain the bladder.  An exam under anesthesia was performed.  A speculum was placed in the vagina and a single-tooth tenaculum was used to grasp the anterior lip of the cervix.  Sponge stick was placed in the vagina for uterine manipulation  Attention was then turned to the abdomen.    A 5 mm incision was made in the umbilicus. The anterior abdominal wall was tented up. Opti-Ericka non-bladed trocar was placed intra-abdominally visualizing all tissue planes through to peritoneum. Entry into the intra-abdominal cavity was confirmed. CO2 pneumoperitoneum was inflated.    A full evaluation of the abdomen and pelvis was performed with findings as above.  There was no noted damage to underlying bowel, bladder, blood vessels, or organs. The patient was placed in steep trendelenburg. A 5 mm incision was made on the patient's left side 2 cm superior to the ASIS and 8cm from the umbilicus. A 5 mm trocar was placed under direct visualization.  A 10 mm incision was made on the patient's right side 2 cm superior to the ASIS  and 8cm from the umbilicus, an 11 mm trocar was placed under direct visualization.      The fallopian tubes were identified, followed to the fimbriated ends and Enseal instrument was used to cauterize and cut the fallopian tubes 2cm from the cornu of the uterus bilateral.  Hemostasis was noted and pictures of each side were taken. CO2 pneumoperitoneum was deflated to a pressure of 6 and no bleeding noted.  CO2 pneumoperitoneum was deflated.  All trocars were removed.     The  skin was closed with 4-0 Vicryl in a subcuticular fashion.    All instruments were removed from the patient's vagina.  Sponge, lap, and instrument counts are correct times 2.  The patient was taken to the recovery room awake and in stable condition.    Pt was discharged in stable condition from PACU with script for pain medication and instructions to follow up in the office in 2-3 weeks.    Electronically signed by:    Rochelle Rivera DO  04/05/24  10:15 EDT

## 2024-04-05 NOTE — ANESTHESIA POSTPROCEDURE EVALUATION
Patient: Kenzie Marcos    Procedure Summary       Date: 04/05/24 Room / Location: [facility] OR  / [facility]    Anesthesia Start: 0922 Anesthesia Stop: 1017    Procedure: Operative laparoscopy with bilateral salpingectomy (Bilateral: Abdomen) Diagnosis:       Encounter for sterilization      (Encounter for sterilization [Z30.2])    Surgeons: Rochelle Rivera DO Provider: Frantz Henry MD    Anesthesia Type: general ASA Status: 1            Anesthesia Type: general    Vitals  Vitals Value Taken Time   /60 04/05/24 1106   Temp 36.7 °C (98 °F) 04/05/24 1015   Pulse 63 04/05/24 1114   Resp 14 04/05/24 1020   SpO2 98 % 04/05/24 1114   Vitals shown include unfiled device data.        Post Anesthesia Care and Evaluation    Patient location during evaluation: bedside  Patient participation: complete - patient participated  Level of consciousness: awake  Pain management: adequate    Airway patency: patent  PONV Status: none  Cardiovascular status: acceptable and stable  Respiratory status: acceptable  Hydration status: acceptable    Comments: An Anesthesiologist personally participated in the most demanding procedures (including induction and emergence if applicable) in the anesthesia plan, monitored the course of anesthesia administration at frequent intervals and remained physically present and available for immediate diagnosis and treatment of emergencies.

## 2024-04-08 LAB
CYTO UR: NORMAL
LAB AP CASE REPORT: NORMAL
LAB AP CLINICAL INFORMATION: NORMAL
PATH REPORT.FINAL DX SPEC: NORMAL
PATH REPORT.GROSS SPEC: NORMAL

## 2024-04-18 ENCOUNTER — OFFICE VISIT (OUTPATIENT)
Dept: OBSTETRICS AND GYNECOLOGY | Facility: CLINIC | Age: 34
End: 2024-04-18
Payer: COMMERCIAL

## 2024-04-18 VITALS
WEIGHT: 129 LBS | DIASTOLIC BLOOD PRESSURE: 76 MMHG | BODY MASS INDEX: 22.86 KG/M2 | HEIGHT: 63 IN | SYSTOLIC BLOOD PRESSURE: 132 MMHG

## 2024-04-18 DIAGNOSIS — Z98.890 POST-OPERATIVE STATE: Primary | ICD-10-CM

## 2024-04-18 PROCEDURE — 99024 POSTOP FOLLOW-UP VISIT: CPT | Performed by: OBSTETRICS & GYNECOLOGY

## 2024-04-18 NOTE — PROGRESS NOTES
"Post Operative Visit        Chief Complaint   Patient presents with    Post-op     Operative laparoscopy with bilateral salpingectomy (Bilateral: Abdomen)       HPI:   Kenzie Marcos is here for a post op visit.  She had a Operative laparoscopy with bilateral salpingectomy and has no complaints.  We have gone over her pathology and any available pictures and all questions have been answered.    Pain:  No  Vaginal bleeding:  No  Vaginal discharge:  No  Fever/chills:  No  Good appetite:  Yes  Normal bladder function:  Yes  Normal bowel function:  Yes  Hot flashes: No    PHYSICAL EXAM:  /76   Ht 160 cm (63\")   Wt 58.5 kg (129 lb)   LMP 04/13/2024 (Exact Date)   BMI 22.85 kg/m²   General- NAD, alert and oriented, appropriate  Psych- Normal mood, good memory  Abdomen- Soft, non distended, non tender, no masses  Incisions-  Clean, dry, intact, healing well =  Lymphatic- No palpable groin nodes  Ext- No edema, no cyanosis   Skin- No lesions, no rashes      ASSESSMENT and PLAN:  Post-operative exam    Diagnoses and all orders for this visit:    1. Post-operative state (Primary)        Operative report, surgical findings and any pathology/photos reviewed.  All questions answered.     Counseling:   Ok to resume normal activities  Ok to resume intercourse  Return to school/work without limitations      Follow Up:  Return in about 1 year (around 4/18/2025) for Annual exam.    I spent 20 minutes on the separately reported service of Post op visit. This time is not included in the time used to support the E/M service also reported today.        Rochelle Rivera DO  04/18/2024    [facility]  [address] [place] [address]  Dept: [phone]  Dept Fax: [phone]  Loc: [phone]  Loc Fax: [phone]     "

## 2024-06-21 ENCOUNTER — OFFICE VISIT (OUTPATIENT)
Dept: INTERNAL MEDICINE | Facility: CLINIC | Age: 34
End: 2024-06-21
Payer: COMMERCIAL

## 2024-06-21 VITALS
OXYGEN SATURATION: 98 % | BODY MASS INDEX: 23.21 KG/M2 | HEART RATE: 95 BPM | WEIGHT: 131 LBS | SYSTOLIC BLOOD PRESSURE: 110 MMHG | HEIGHT: 63 IN | RESPIRATION RATE: 15 BRPM | DIASTOLIC BLOOD PRESSURE: 70 MMHG | TEMPERATURE: 98.2 F

## 2024-06-21 DIAGNOSIS — L30.9 DERMATITIS: ICD-10-CM

## 2024-06-21 DIAGNOSIS — Z13.220 SCREENING FOR CHOLESTEROL LEVEL: ICD-10-CM

## 2024-06-21 DIAGNOSIS — Z00.00 ANNUAL PHYSICAL EXAM: Primary | ICD-10-CM

## 2024-06-21 DIAGNOSIS — O24.419 GESTATIONAL DIABETES MELLITUS (GDM), ANTEPARTUM, GESTATIONAL DIABETES METHOD OF CONTROL UNSPECIFIED: ICD-10-CM

## 2024-06-21 LAB
ALBUMIN SERPL-MCNC: 4.7 G/DL (ref 3.5–5.2)
ALBUMIN/GLOB SERPL: 2 G/DL
ALP SERPL-CCNC: 71 U/L (ref 39–117)
ALT SERPL W P-5'-P-CCNC: 26 U/L (ref 1–33)
ANION GAP SERPL CALCULATED.3IONS-SCNC: 11.1 MMOL/L (ref 5–15)
AST SERPL-CCNC: 19 U/L (ref 1–32)
BASOPHILS # BLD AUTO: 0.03 10*3/MM3 (ref 0–0.2)
BASOPHILS NFR BLD AUTO: 0.6 % (ref 0–1.5)
BILIRUB SERPL-MCNC: 0.4 MG/DL (ref 0–1.2)
BUN SERPL-MCNC: 11 MG/DL (ref 6–20)
BUN/CREAT SERPL: 14.7 (ref 7–25)
CALCIUM SPEC-SCNC: 9.6 MG/DL (ref 8.6–10.5)
CHLORIDE SERPL-SCNC: 103 MMOL/L (ref 98–107)
CHOLEST SERPL-MCNC: 207 MG/DL (ref 0–200)
CO2 SERPL-SCNC: 24.9 MMOL/L (ref 22–29)
CREAT SERPL-MCNC: 0.75 MG/DL (ref 0.57–1)
DEPRECATED RDW RBC AUTO: 37.7 FL (ref 37–54)
EGFRCR SERPLBLD CKD-EPI 2021: 106.6 ML/MIN/1.73
EOSINOPHIL # BLD AUTO: 0.07 10*3/MM3 (ref 0–0.4)
EOSINOPHIL NFR BLD AUTO: 1.3 % (ref 0.3–6.2)
ERYTHROCYTE [DISTWIDTH] IN BLOOD BY AUTOMATED COUNT: 11.2 % (ref 12.3–15.4)
GLOBULIN UR ELPH-MCNC: 2.3 GM/DL
GLUCOSE SERPL-MCNC: 85 MG/DL (ref 65–99)
HBA1C MFR BLD: 5.2 % (ref 4.8–5.6)
HCT VFR BLD AUTO: 44.1 % (ref 34–46.6)
HDLC SERPL-MCNC: 55 MG/DL (ref 40–60)
HGB BLD-MCNC: 14.7 G/DL (ref 12–15.9)
IMM GRANULOCYTES # BLD AUTO: 0.02 10*3/MM3 (ref 0–0.05)
IMM GRANULOCYTES NFR BLD AUTO: 0.4 % (ref 0–0.5)
LDLC SERPL CALC-MCNC: 133 MG/DL (ref 0–100)
LDLC/HDLC SERPL: 2.39 {RATIO}
LYMPHOCYTES # BLD AUTO: 1.44 10*3/MM3 (ref 0.7–3.1)
LYMPHOCYTES NFR BLD AUTO: 27.1 % (ref 19.6–45.3)
MCH RBC QN AUTO: 30.3 PG (ref 26.6–33)
MCHC RBC AUTO-ENTMCNC: 33.3 G/DL (ref 31.5–35.7)
MCV RBC AUTO: 90.9 FL (ref 79–97)
MONOCYTES # BLD AUTO: 0.3 10*3/MM3 (ref 0.1–0.9)
MONOCYTES NFR BLD AUTO: 5.6 % (ref 5–12)
NEUTROPHILS NFR BLD AUTO: 3.46 10*3/MM3 (ref 1.7–7)
NEUTROPHILS NFR BLD AUTO: 65 % (ref 42.7–76)
NRBC BLD AUTO-RTO: 0 /100 WBC (ref 0–0.2)
PLATELET # BLD AUTO: 197 10*3/MM3 (ref 140–450)
PMV BLD AUTO: 11.5 FL (ref 6–12)
POTASSIUM SERPL-SCNC: 4.3 MMOL/L (ref 3.5–5.2)
PROT SERPL-MCNC: 7 G/DL (ref 6–8.5)
RBC # BLD AUTO: 4.85 10*6/MM3 (ref 3.77–5.28)
SODIUM SERPL-SCNC: 139 MMOL/L (ref 136–145)
TRIGL SERPL-MCNC: 104 MG/DL (ref 0–150)
TSH SERPL DL<=0.05 MIU/L-ACNC: 1.15 UIU/ML (ref 0.27–4.2)
VLDLC SERPL-MCNC: 19 MG/DL (ref 5–40)
WBC NRBC COR # BLD AUTO: 5.32 10*3/MM3 (ref 3.4–10.8)

## 2024-06-21 PROCEDURE — 1160F RVW MEDS BY RX/DR IN RCRD: CPT | Performed by: PHYSICIAN ASSISTANT

## 2024-06-21 PROCEDURE — 1159F MED LIST DOCD IN RCRD: CPT | Performed by: PHYSICIAN ASSISTANT

## 2024-06-21 PROCEDURE — 83036 HEMOGLOBIN GLYCOSYLATED A1C: CPT | Performed by: PHYSICIAN ASSISTANT

## 2024-06-21 PROCEDURE — 84443 ASSAY THYROID STIM HORMONE: CPT | Performed by: PHYSICIAN ASSISTANT

## 2024-06-21 PROCEDURE — 80061 LIPID PANEL: CPT | Performed by: PHYSICIAN ASSISTANT

## 2024-06-21 PROCEDURE — 1126F AMNT PAIN NOTED NONE PRSNT: CPT | Performed by: PHYSICIAN ASSISTANT

## 2024-06-21 PROCEDURE — 36415 COLL VENOUS BLD VENIPUNCTURE: CPT | Performed by: PHYSICIAN ASSISTANT

## 2024-06-21 PROCEDURE — 2014F MENTAL STATUS ASSESS: CPT | Performed by: PHYSICIAN ASSISTANT

## 2024-06-21 PROCEDURE — 80053 COMPREHEN METABOLIC PANEL: CPT | Performed by: PHYSICIAN ASSISTANT

## 2024-06-21 PROCEDURE — 99395 PREV VISIT EST AGE 18-39: CPT | Performed by: PHYSICIAN ASSISTANT

## 2024-06-21 PROCEDURE — 85025 COMPLETE CBC W/AUTO DIFF WBC: CPT | Performed by: PHYSICIAN ASSISTANT

## 2024-06-21 RX ORDER — METHYLPREDNISOLONE 4 MG/1
TABLET ORAL
Qty: 21 TABLET | Refills: 0 | Status: SHIPPED | OUTPATIENT
Start: 2024-06-21

## 2024-06-21 NOTE — PROGRESS NOTES
"Chief Complaint  skin issue    Subjective          Kenzie Marcos presents to [facility]  History of Present Illness  Patient here for physical and a rash.      Pt noticed redness and dryness under eyes x a few wks  Started on L eye then moved to both  Denies rash anywhere else  Denies blurry vision or vision changes   Denies new soap, lotion, detergent  She stopped using face wash   Feels dry   Denies pain or itching  She does not wear make up   Pt has been using castoril oil which has helped slightly  Pt is 5 months postpartum  Had gestational diabetes that was diet-controlled with 2 previous pregnancies  Past Medical History:   Diagnosis Date    Abnormal Pap smear of cervix     Anemia     Anxiety     Chlamydia     Depression     Gestational diabetes     HPV (human papilloma virus) infection     Urogenital trichomoniasis     Vitamin D deficiency         Past Surgical History:   Procedure Laterality Date    MOUTH SURGERY      SALPINGECTOMY Bilateral 4/5/2024    Procedure: Operative laparoscopy with bilateral salpingectomy;  Surgeon: Rochelle Rivera DO;  Location: [facility] OR [facility];  Service: Gynecology;  Laterality: Bilateral;    WISDOM TOOTH EXTRACTION          Current Outpatient Medications on File Prior to Visit   Medication Sig Dispense Refill    prenatal vitamin tablet Take  by mouth Daily.       No current facility-administered medications on file prior to visit.        No Known Allergies    Social History     Tobacco Use   Smoking Status Never    Passive exposure: Yes   Smokeless Tobacco Never   Tobacco Comments    Light exposure to tobacco smoke          Objective   Vital Signs:   /70 (BP Location: Right arm, Patient Position: Sitting, Cuff Size: Adult)   Pulse 95   Temp 98.2 °F (36.8 °C) (Temporal)   Resp 15   Ht 160 cm (63\")   Wt 59.4 kg (131 lb)   SpO2 98%   BMI 23.21 kg/m²     Physical Exam  Vitals reviewed.   Constitutional:       Appearance: " Normal appearance.   HENT:      Head: Normocephalic and atraumatic.        Nose: Nose normal.      Mouth/Throat:      Mouth: Mucous membranes are moist.   Eyes:      Extraocular Movements: Extraocular movements intact.      Conjunctiva/sclera: Conjunctivae normal.      Pupils: Pupils are equal, round, and reactive to light.   Cardiovascular:      Rate and Rhythm: Normal rate and regular rhythm.   Pulmonary:      Effort: Pulmonary effort is normal.      Breath sounds: Normal breath sounds.   Abdominal:      General: Abdomen is flat. Bowel sounds are normal.      Palpations: Abdomen is soft.   Musculoskeletal:         General: Normal range of motion.   Neurological:      General: No focal deficit present.      Mental Status: She is alert and oriented to person, place, and time.   Psychiatric:         Mood and Affect: Mood normal.        Result Review :            BMI is within normal parameters. No other follow-up for BMI required.              Assessment and Plan    Diagnoses and all orders for this visit:    1. Annual physical exam (Primary)  Assessment & Plan:  Reviewed preventative medication recommendations that are age appropriate for the patient. Education provided for health and wellness. Encouraged healthy diet, regular exercise, and routine wellness checkups.        2. Dermatitis  Comments:  Discussed DDx.  Will treat with p.o. steroids.  Encourage patient to call and schedule follow-up with dermatology, she is already established.  Orders:  -     Comprehensive Metabolic Panel  -     CBC & Differential  -     TSH    3. Gestational diabetes mellitus (GDM), antepartum, gestational diabetes method of control unspecified  -     Hemoglobin A1c    4. Screening for cholesterol level  -     Lipid Panel    Other orders  -     methylPREDNISolone (MEDROL) 4 MG dose pack; Take as directed on package instructions.  Dispense: 21 tablet; Refill: 0        Follow Up   Return if symptoms worsen or fail to improve.  Patient  was given instructions and counseling regarding her condition or for health maintenance advice. Please see specific information pulled into the AVS if appropriate.

## 2024-08-30 RX ORDER — IBUPROFEN 800 MG/1
800 TABLET, FILM COATED ORAL EVERY 6 HOURS PRN
Qty: 90 TABLET | Refills: 0 | Status: SHIPPED | OUTPATIENT
Start: 2024-08-30

## 2024-09-04 ENCOUNTER — OFFICE VISIT (OUTPATIENT)
Dept: INTERNAL MEDICINE | Facility: CLINIC | Age: 34
End: 2024-09-04
Payer: COMMERCIAL

## 2024-09-04 VITALS
HEIGHT: 63 IN | SYSTOLIC BLOOD PRESSURE: 110 MMHG | TEMPERATURE: 99.2 F | RESPIRATION RATE: 15 BRPM | DIASTOLIC BLOOD PRESSURE: 60 MMHG | WEIGHT: 128 LBS | OXYGEN SATURATION: 99 % | HEART RATE: 93 BPM | BODY MASS INDEX: 22.68 KG/M2

## 2024-09-04 DIAGNOSIS — U07.1 COVID-19 VIRUS DETECTED: ICD-10-CM

## 2024-09-04 DIAGNOSIS — R05.1 ACUTE COUGH: Primary | ICD-10-CM

## 2024-09-04 PROCEDURE — 1126F AMNT PAIN NOTED NONE PRSNT: CPT | Performed by: PHYSICIAN ASSISTANT

## 2024-09-04 PROCEDURE — 1159F MED LIST DOCD IN RCRD: CPT | Performed by: PHYSICIAN ASSISTANT

## 2024-09-04 PROCEDURE — 1160F RVW MEDS BY RX/DR IN RCRD: CPT | Performed by: PHYSICIAN ASSISTANT

## 2024-09-04 PROCEDURE — 99213 OFFICE O/P EST LOW 20 MIN: CPT | Performed by: PHYSICIAN ASSISTANT

## 2024-09-04 RX ORDER — BENZONATATE 100 MG/1
100 CAPSULE ORAL 3 TIMES DAILY PRN
Qty: 30 CAPSULE | Refills: 0 | Status: SHIPPED | OUTPATIENT
Start: 2024-09-04 | End: 2024-09-14

## 2024-09-04 RX ORDER — AZITHROMYCIN 250 MG/1
TABLET, FILM COATED ORAL
Qty: 6 TABLET | Refills: 0 | Status: SHIPPED | OUTPATIENT
Start: 2024-09-04

## 2024-09-04 NOTE — PROGRESS NOTES
"Chief Complaint  Cough    Subjective          Kenzie Marcos presents to [facility]  Cough      8/13 had fever and chills  Tested positive for covid  Mild cough initially but it has worsened  Cough is wet at times, dry at times  Denies wheezing, resp distress  She feels short of breath with activity   Low grade fever 99  She is still having chills  Appetite is hit or miss  Feels tired  She has taken robatussin     Past Medical History:   Diagnosis Date    Abnormal Pap smear of cervix     Anemia     Anxiety     Chlamydia     Depression     Gestational diabetes     HPV (human papilloma virus) infection     Urogenital trichomoniasis     Vitamin D deficiency         Past Surgical History:   Procedure Laterality Date    MOUTH SURGERY      SALPINGECTOMY Bilateral 04/05/2024    Procedure: Operative laparoscopy with bilateral salpingectomy;  Surgeon: Rochelle Rivera DO;  Location: [facility];  Service: Gynecology;  Laterality: Bilateral;    TUBAL ABDOMINAL LIGATION  April 2024    Tubes removed    WISDOM TOOTH EXTRACTION          Current Outpatient Medications on File Prior to Visit   Medication Sig Dispense Refill    [DISCONTINUED] ibuprofen (ADVIL,MOTRIN) 800 MG tablet Take 1 tablet by mouth Every 6 (Six) Hours As Needed for Mild Pain. 90 tablet 0    [DISCONTINUED] methylPREDNISolone (MEDROL) 4 MG dose pack Take as directed on package instructions. 21 tablet 0    [DISCONTINUED] prenatal vitamin tablet Take  by mouth Daily.       No current facility-administered medications on file prior to visit.        No Known Allergies    Social History     Tobacco Use   Smoking Status Never    Passive exposure: Yes   Smokeless Tobacco Never   Tobacco Comments    Light exposure to tobacco smoke          Objective   Vital Signs:   /60 (BP Location: Right arm, Patient Position: Sitting, Cuff Size: Adult)   Pulse 93   Temp 99.2 °F (37.3 °C) (Temporal)   Resp 15   Ht 160 cm (63\")  "  Wt 58.1 kg (128 lb)   SpO2 99%   BMI 22.67 kg/m²     Physical Exam  Vitals reviewed.   Constitutional:       Appearance: Normal appearance.   HENT:      Head: Normocephalic and atraumatic.      Nose: Nose normal.      Mouth/Throat:      Mouth: Mucous membranes are moist.   Eyes:      Extraocular Movements: Extraocular movements intact.      Conjunctiva/sclera: Conjunctivae normal.      Pupils: Pupils are equal, round, and reactive to light.   Cardiovascular:      Rate and Rhythm: Normal rate and regular rhythm.   Pulmonary:      Effort: Pulmonary effort is normal.      Breath sounds: Normal breath sounds.   Abdominal:      General: Abdomen is flat. Bowel sounds are normal.      Palpations: Abdomen is soft.   Musculoskeletal:         General: Normal range of motion.   Neurological:      General: No focal deficit present.      Mental Status: She is alert and oriented to person, place, and time.   Psychiatric:         Mood and Affect: Mood normal.        Result Review :   The following data was reviewed by: Gayatri Diaz PA-C on 09/04/2024:    Data reviewed : Radiologic studies cxr      BMI is within normal parameters. No other follow-up for BMI required.          Assessment and Plan    Diagnoses and all orders for this visit:    1. Acute cough (Primary)  -     XR Chest PA & Lateral (In Office)    2. COVID-19 virus detected    Other orders  -     azithromycin (Zithromax Z-Justyn) 250 MG tablet; Take 2 tablets by mouth on day 1, then 1 tablet daily on days 2-5  Dispense: 6 tablet; Refill: 0  -     benzonatate (Tessalon Perles) 100 MG capsule; Take 1 capsule by mouth 3 (Three) Times a Day As Needed for Cough for up to 10 days.  Dispense: 30 capsule; Refill: 0    CXR wnl. No pneumonia. Will start Zpac to help with inflammation post covid. Cont symptomatic tx. To er if sx worsen, resp distress, chest pain. Pt understands and agrees    Follow Up   Return if symptoms worsen or fail to improve.  Patient was given  instructions and counseling regarding her condition or for health maintenance advice. Please see specific information pulled into the AVS if appropriate.

## 2024-09-18 RX ORDER — IBUPROFEN 800 MG/1
800 TABLET, FILM COATED ORAL EVERY 6 HOURS PRN
Qty: 90 TABLET | Refills: 0 | Status: SHIPPED | OUTPATIENT
Start: 2024-09-18

## (undated) DEVICE — SYR LL TP 10ML STRL

## (undated) DEVICE — SHEET,DRAPE,70X85,STERILE: Brand: MEDLINE

## (undated) DEVICE — LAPAROSCOPIC TROCAR SLEEVE/SINGLE USE: Brand: KII® OPTICAL ACCESS SYSTEM

## (undated) DEVICE — HYPODERMIC SAFETY NEEDLE: Brand: MONOJECT

## (undated) DEVICE — ADHS SKIN SURG TISS VISC PREMIERPRO EXOFIN HI/VISC FAST/DRY

## (undated) DEVICE — 40585 XL ADVANCED TRENDELENBURG POSITIONING KIT: Brand: 40585 XL ADVANCED TRENDELENBURG POSITIONING KIT

## (undated) DEVICE — DUAL LUMEN STOMACH TUBE: Brand: SALEM SUMP

## (undated) DEVICE — APPL CHLORAPREP HI/LITE 26ML ORNG

## (undated) DEVICE — GLV SURG BIOGEL LTX PF 6 1/2

## (undated) DEVICE — TROCAR: Brand: KII® SLEEVE

## (undated) DEVICE — ANTIBACTERIAL UNDYED BRAIDED (POLYGLACTIN 910), SYNTHETIC ABSORBABLE SUTURE: Brand: COATED VICRYL

## (undated) DEVICE — LITHOTOMY-YELLOW FINS: Brand: MEDLINE INDUSTRIES, INC.

## (undated) DEVICE — SLV SCD KN/LEN ADJ EXPRSS BLENDED MD 1P/U

## (undated) DEVICE — GLOVE,SURG,SENSICARE SLT,LF,PF,6.5: Brand: MEDLINE

## (undated) DEVICE — 1200 DOUBLE MAGNET NEEDLE COUNTER,BLACK: Brand: DEVON

## (undated) DEVICE — TROCARS: Brand: KII® OPTICAL ACCESS SYSTEM

## (undated) DEVICE — INTENDED FOR TISSUE SEPARATION, AND OTHER PROCEDURES THAT REQUIRE A SHARP SURGICAL BLADE TO PUNCTURE OR CUT.: Brand: BARD-PARKER ® SAFETYLOCK CARBON RIB-BACK BLADES

## (undated) DEVICE — TBG INSUFFLATION W/CPC CONNEC: Brand: MEDLINE INDUSTRIES, INC.

## (undated) DEVICE — ENSEAL X1 TISSUE SEALER, CURVED JAW, 37 CM SHAFT LENGTH: Brand: ENSEAL

## (undated) DEVICE — SKIN PREP TRAY W/CHG: Brand: MEDLINE INDUSTRIES, INC.

## (undated) DEVICE — KIT,ANTI FOG,W/SPONGE & FLUID,SOFT PACK: Brand: MEDLINE

## (undated) DEVICE — CATH URETH INTRMIT ALLPURP LTX 16F RED